# Patient Record
Sex: MALE | Race: WHITE
[De-identification: names, ages, dates, MRNs, and addresses within clinical notes are randomized per-mention and may not be internally consistent; named-entity substitution may affect disease eponyms.]

---

## 2023-04-10 ENCOUNTER — HOSPITAL ENCOUNTER (INPATIENT)
Dept: HOSPITAL 47 - EC | Age: 68
LOS: 5 days | Discharge: NURSING, REHAB OR LONG-TERM CARE | DRG: 460 | End: 2023-04-15
Attending: ORTHOPAEDIC SURGERY | Admitting: ORTHOPAEDIC SURGERY
Payer: COMMERCIAL

## 2023-04-10 DIAGNOSIS — Z79.899: ICD-10-CM

## 2023-04-10 DIAGNOSIS — S19.9XXS: ICD-10-CM

## 2023-04-10 DIAGNOSIS — T38.0X5A: ICD-10-CM

## 2023-04-10 DIAGNOSIS — M47.14: ICD-10-CM

## 2023-04-10 DIAGNOSIS — G89.29: ICD-10-CM

## 2023-04-10 DIAGNOSIS — R29.6: ICD-10-CM

## 2023-04-10 DIAGNOSIS — M62.830: ICD-10-CM

## 2023-04-10 DIAGNOSIS — M62.838: ICD-10-CM

## 2023-04-10 DIAGNOSIS — I10: ICD-10-CM

## 2023-04-10 DIAGNOSIS — Z88.5: ICD-10-CM

## 2023-04-10 DIAGNOSIS — M48.05: ICD-10-CM

## 2023-04-10 DIAGNOSIS — K59.89: ICD-10-CM

## 2023-04-10 DIAGNOSIS — I45.10: ICD-10-CM

## 2023-04-10 DIAGNOSIS — E66.01: ICD-10-CM

## 2023-04-10 DIAGNOSIS — M19.90: ICD-10-CM

## 2023-04-10 DIAGNOSIS — Z98.1: ICD-10-CM

## 2023-04-10 DIAGNOSIS — N39.498: ICD-10-CM

## 2023-04-10 DIAGNOSIS — G83.4: ICD-10-CM

## 2023-04-10 DIAGNOSIS — M48.061: ICD-10-CM

## 2023-04-10 DIAGNOSIS — E78.5: ICD-10-CM
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
## 2023-04-10 DIAGNOSIS — D62: ICD-10-CM

## 2023-04-10 DIAGNOSIS — M48.04: ICD-10-CM

## 2023-04-10 DIAGNOSIS — Z91.81: ICD-10-CM

## 2023-04-10 DIAGNOSIS — D72.828: ICD-10-CM

## 2023-04-10 DIAGNOSIS — Z28.311: ICD-10-CM

## 2023-04-10 DIAGNOSIS — M51.04: Primary | ICD-10-CM

## 2023-04-10 DIAGNOSIS — Z20.822: ICD-10-CM

## 2023-04-10 LAB
ALBUMIN SERPL-MCNC: 4.4 G/DL (ref 3.5–5)
ALP SERPL-CCNC: 57 U/L (ref 38–126)
ALT SERPL-CCNC: 32 U/L (ref 4–49)
ANION GAP SERPL CALC-SCNC: 10 MMOL/L
APTT BLD: 22.1 SEC (ref 22–30)
AST SERPL-CCNC: 30 U/L (ref 17–59)
BASOPHILS # BLD AUTO: 0 K/UL (ref 0–0.2)
BASOPHILS NFR BLD AUTO: 0 %
BUN SERPL-SCNC: 23 MG/DL (ref 9–20)
CALCIUM SPEC-MCNC: 9.2 MG/DL (ref 8.4–10.2)
CHLORIDE SERPL-SCNC: 100 MMOL/L (ref 98–107)
CO2 SERPL-SCNC: 28 MMOL/L (ref 22–30)
EOSINOPHIL # BLD AUTO: 0 K/UL (ref 0–0.7)
EOSINOPHIL NFR BLD AUTO: 1 %
ERYTHROCYTE [DISTWIDTH] IN BLOOD BY AUTOMATED COUNT: 5.48 M/UL (ref 4.3–5.9)
ERYTHROCYTE [DISTWIDTH] IN BLOOD: 13.9 % (ref 11.5–15.5)
GLUCOSE SERPL-MCNC: 112 MG/DL (ref 74–99)
HCT VFR BLD AUTO: 45.5 % (ref 39–53)
HGB BLD-MCNC: 15.9 GM/DL (ref 13–17.5)
INR PPP: 1 (ref ?–1.2)
LYMPHOCYTES # SPEC AUTO: 1.7 K/UL (ref 1–4.8)
LYMPHOCYTES NFR SPEC AUTO: 26 %
MAGNESIUM SPEC-SCNC: 2 MG/DL (ref 1.6–2.3)
MCH RBC QN AUTO: 29.1 PG (ref 25–35)
MCHC RBC AUTO-ENTMCNC: 35 G/DL (ref 31–37)
MCV RBC AUTO: 83.2 FL (ref 80–100)
MONOCYTES # BLD AUTO: 0.5 K/UL (ref 0–1)
MONOCYTES NFR BLD AUTO: 8 %
NEUTROPHILS # BLD AUTO: 4 K/UL (ref 1.3–7.7)
NEUTROPHILS NFR BLD AUTO: 62 %
PH UR: 6 [PH] (ref 5–8)
PLATELET # BLD AUTO: 150 K/UL (ref 150–450)
POTASSIUM SERPL-SCNC: 3.7 MMOL/L (ref 3.5–5.1)
PROT SERPL-MCNC: 7.5 G/DL (ref 6.3–8.2)
PT BLD: 10.8 SEC (ref 9–12)
SODIUM SERPL-SCNC: 138 MMOL/L (ref 137–145)
SP GR UR: 1.02 (ref 1–1.03)
UROBILINOGEN UR QL STRIP: <2 MG/DL (ref ?–2)
WBC # BLD AUTO: 6.4 K/UL (ref 3.8–10.6)

## 2023-04-10 PROCEDURE — 70450 CT HEAD/BRAIN W/O DYE: CPT

## 2023-04-10 PROCEDURE — 85025 COMPLETE CBC W/AUTO DIFF WBC: CPT

## 2023-04-10 PROCEDURE — 86891 AUTOLOGOUS BLOOD OP SALVAGE: CPT

## 2023-04-10 PROCEDURE — 93005 ELECTROCARDIOGRAM TRACING: CPT

## 2023-04-10 PROCEDURE — 85027 COMPLETE CBC AUTOMATED: CPT

## 2023-04-10 PROCEDURE — 72070 X-RAY EXAM THORAC SPINE 2VWS: CPT

## 2023-04-10 PROCEDURE — 85610 PROTHROMBIN TIME: CPT

## 2023-04-10 PROCEDURE — 72158 MRI LUMBAR SPINE W/O & W/DYE: CPT

## 2023-04-10 PROCEDURE — 87635 SARS-COV-2 COVID-19 AMP PRB: CPT

## 2023-04-10 PROCEDURE — 83735 ASSAY OF MAGNESIUM: CPT

## 2023-04-10 PROCEDURE — 96361 HYDRATE IV INFUSION ADD-ON: CPT

## 2023-04-10 PROCEDURE — 71046 X-RAY EXAM CHEST 2 VIEWS: CPT

## 2023-04-10 PROCEDURE — 72131 CT LUMBAR SPINE W/O DYE: CPT

## 2023-04-10 PROCEDURE — 80053 COMPREHEN METABOLIC PANEL: CPT

## 2023-04-10 PROCEDURE — 83605 ASSAY OF LACTIC ACID: CPT

## 2023-04-10 PROCEDURE — 72146 MRI CHEST SPINE W/O DYE: CPT

## 2023-04-10 PROCEDURE — 36415 COLL VENOUS BLD VENIPUNCTURE: CPT

## 2023-04-10 PROCEDURE — 96374 THER/PROPH/DIAG INJ IV PUSH: CPT

## 2023-04-10 PROCEDURE — 85730 THROMBOPLASTIN TIME PARTIAL: CPT

## 2023-04-10 PROCEDURE — 81003 URINALYSIS AUTO W/O SCOPE: CPT

## 2023-04-10 PROCEDURE — 94760 N-INVAS EAR/PLS OXIMETRY 1: CPT

## 2023-04-10 PROCEDURE — 72128 CT CHEST SPINE W/O DYE: CPT

## 2023-04-10 PROCEDURE — 93306 TTE W/DOPPLER COMPLETE: CPT

## 2023-04-10 PROCEDURE — 51798 US URINE CAPACITY MEASURE: CPT

## 2023-04-10 PROCEDURE — 72100 X-RAY EXAM L-S SPINE 2/3 VWS: CPT

## 2023-04-10 PROCEDURE — 80048 BASIC METABOLIC PNL TOTAL CA: CPT

## 2023-04-10 PROCEDURE — 99291 CRITICAL CARE FIRST HOUR: CPT

## 2023-04-10 PROCEDURE — 72192 CT PELVIS W/O DYE: CPT

## 2023-04-10 RX ADMIN — DEXAMETHASONE SODIUM PHOSPHATE SCH MG: 4 INJECTION, SOLUTION INTRAMUSCULAR; INTRAVENOUS at 23:45

## 2023-04-10 RX ADMIN — TAMSULOSIN HYDROCHLORIDE SCH MG: 0.4 CAPSULE ORAL at 20:47

## 2023-04-10 RX ADMIN — NAPROXEN PRN MG: 250 TABLET ORAL at 20:47

## 2023-04-10 RX ADMIN — DEXAMETHASONE SODIUM PHOSPHATE SCH MG: 4 INJECTION, SOLUTION INTRAMUSCULAR; INTRAVENOUS at 19:25

## 2023-04-10 RX ADMIN — DEXAMETHASONE SODIUM PHOSPHATE SCH MG: 4 INJECTION, SOLUTION INTRAMUSCULAR; INTRAVENOUS at 14:59

## 2023-04-10 RX ADMIN — CYCLOBENZAPRINE HYDROCHLORIDE PRN MG: 10 TABLET, FILM COATED ORAL at 20:47

## 2023-04-10 RX ADMIN — CYCLOBENZAPRINE HYDROCHLORIDE PRN MG: 10 TABLET, FILM COATED ORAL at 15:53

## 2023-04-10 RX ADMIN — CEFAZOLIN SCH MLS/HR: 330 INJECTION, POWDER, FOR SOLUTION INTRAMUSCULAR; INTRAVENOUS at 23:51

## 2023-04-10 RX ADMIN — ENOXAPARIN SODIUM SCH MG: 40 INJECTION SUBCUTANEOUS at 15:53

## 2023-04-10 RX ADMIN — NAPROXEN PRN MG: 250 TABLET ORAL at 15:53

## 2023-04-10 RX ADMIN — CEFAZOLIN SCH MLS/HR: 330 INJECTION, POWDER, FOR SOLUTION INTRAMUSCULAR; INTRAVENOUS at 11:19

## 2023-04-10 NOTE — CT
EXAMINATION TYPE: CT brain wo con

 

DATE OF EXAM: 4/10/2023

 

COMPARISON:   None 

 

HISTORY: Weakness and left leg numbness

 

CT DLP: 1188.4 mGycm

 

Unenhanced CT of the brain was performed.  

 

The ventricles, basal cisterns and sulci overlying the cerebral convexities demonstrate mild enlargem
ent. 

 

There is no evidence for intracranial hemorrhage or sulcal effacement.  

 

There is decreased attenuation about the periventricular white matter and deep white matter of both c
erebral hemispheres, compatible with chronic small vessel ischemia. Differential diagnosis does inclu
de demyelination. 

 

No mass effects are seen.No midline shift.

 

Osseous calvarium is intact.  

 

If symptoms persist consider MRI.  

 

IMPRESSION:

 

1. Age related atrophic and chronic small vessel ischemic change without acute intracranial process s
een at this time.

## 2023-04-10 NOTE — MR
EXAMINATION TYPE: MR thoracic spine wo con

 

DATE OF EXAM: 4/10/2023

 

COMPARISON: None

 

HISTORY: Concern for cauda equina syndrome. LE weakness, h/o lumbar fusion L1-S1, multiple falls.

 

CONTRAST:  Performed utilizing mL intravenous gadolinium contrast.  

 

TECHNIQUE: Multiplanar, multiecho imaging on a 3.0 Carli magnet is performed through the thoracic spi
ne. 

 

There is increased signal within the spinal cord at the lower thoracic level possibly T11-12.. There 
is broad-based disc bulging and facet hypertrophy with spinal cord contact and compression. There is 
additional milder narrowing at what appears to be T12-L1. No cord compression however is evident alth
ough the canal is narrowed. Similar narrowing is present in what may be the L1-2 level

 

Disc heights are preserved. Vertebral body heights are preserved. Vertebral body alignment is normal.


 

IMPRESSIONS:

 

1. Spinal canal stenosis with signal abnormality within the mildly compressed cord appears to be at t
he T11-12 level.

2. Scattered milder spinal canal narrowing is present T12-L1 and L1-2 near the edge of the field-of-v
iew

## 2023-04-10 NOTE — P.CONS
History of Present Illness





- Reason for Consult


Consult date: 04/10/23


Medical management


Requesting physician: KULWINDER Townsend





- Chief Complaint


Worsening lower back pain





- History of Present Illness





This is a very pleasant 67-year-old patient who follows with Dr. Jaden Richmond.


Patient has been on disability for 40 years.  Had work-related accident.  Since 

then patient had several surgeries seen many surgeons and has fused vertebra to 

the mid to lower spine and cervical spine.  He is not allowed to lift more than 

5 pounds.  Patient is sensation mid back off words and below that he has altered

sensation.  Patient is incontinent of urine and rhythm 2 minutes has to go.  

Patient a month ago had a fall and has become increasingly weak in the left leg.

 An has to drag his right leg.  Using a walker.  Has been has been getting worse

he decided to come in.  Looking at possible surgery.





Review of systems:


GEN.: None


EYES: None


HEENT: None


NECK: None


RESPIRATORY: None


CARDIOVASCULAR: None


GASTROINTESTINAL: Bowel or urinary incontinence


GENITOURINARY: Incontinence.  Nausea fall he


MUSCULOSKELETAL: As above


LYMPHATICS: None


HEMATOLOGICAL: None  


PSYCHIATRY: None


NEUROLOGICAL: Oozing a walker





Past medical history to include:


Muscle spasms, possibly neurogenic bladder, essential hypertension, 

hyperlipidemia, DJD,





Social history:


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





Physical examination:


VITAL SIGNS: 97.7, 93, 19, 113/57, 99% room air]


GENERAL: BMI 46.2, declining bed not in distress.


EYES: Pupils equal.  Conjunctiva normal.


HEENT: External appearance of nose and ears normal, oral cavity grossly normal.


NECK: JVD not raised; masses not palpable.


HEART: First and second heart sounds are normal;  no edema.  


LUNGS: Respiratory rate normal; clear to auscultation.  


ABDOMEN: Soft,  nontender, liver spleen not palpable, no masses palpable.  Clancy

catheter 


PSYCH: Alert and oriented x3;  mood  and affect normal.  


MUSCULOSKELETAL:No Clubbing/cyanosis;muscles-grossly intact


NEUROLOGICAL: [Cranial nerves grossly intact; no facial asymmetry, decreased 

power in both lower extremity.  Decreased sensation


LYMPHATICS: No lymph nodes palpable in the axilla and neck





INVESTIGATIONS, reviewed in the clinical context:


White count 6.4 hemoglobin 15.9 platelets 150 sodium 138 potassium 3.7 BUN 23 

creatinine 0.79


UA: Negative


CT pelvis without contrast: BG each DG changes at sacrococcygeal junction.  No 

fracture


CT brain: Age-related changes


Knee x-ray: Unremarkable


CT thorax with lumbar spine without contrast: Multilevel foraminal stenosis 

throughout lumbar spine.  Especially at L3-L4 and L5-S1.  Also at L1-L2.


Chest x-ray film personally reviewed by me-unremarkable


EKG tracing personally reviewed by me-sinus rhythm.  Right bundle branch block.





Assessment and plan:





-Patient with work-related injury about 40 years ago.  Has had fusion of some 

cervical vertebra and in the back.  Patient had a fall a month ago.  Progressive

 increasing pain in the back and trouble walking walking.  Also bladder and 

urine has been affected bit worse than before


Being evaluated for surgery by Dr. Vee from orthopedics.  Pain control.





-Urine and bladder incontinence, some acute on chronic element


Clancy catheter





-Morbid obesity BMI 46.2


Weight loss measures





-Muscle spasm


Flexeril 10 mg when necessary





-Essential hypertension


Norvasc 2.5 mg, losartan hydrochlorothiazide 100/25,





-Hyperlipidemia


Crestor 5 mg





-Chronic gait dysfunction uses a walker at baseline.





-Perioperative cardiovascular risk assessment: Patient has underlying low 

exercise tolerance because of chronic back pain and disability.  Patient denies 

any active chest pain or shortness of breath.  We will do obese line 2-D 

echocardiogram.  Patient's had a moderate cardiovascular risk for surgery.  No 

contraindications to proceed with surgery.





Thank you Dr. Townsend





Past Medical History


Past Medical History: Hypertension


Additional Past Medical History / Comment(s): History of multiple surgeries 15 

at his spine.  His most recent lumbar surgery he says was about 2015 with Dr. Chatterjee where he underwent decompression and further fusion.  He is also had 

decompression and fusion in his cervical spine.  He says that normally he 

endplates without walker or cane but he has been increasing his need for 

ambulatory devices over the past 6 weeks and now requires a walker to ambulate 

and can only but about 50% of his weight on his legs.  BACK PAIN


History of Any Multi-Drug Resistant Organisms: None Reported


Past Surgical History: Appendectomy, Back Surgery


Additional Past Surgical History / Comment(s): 15 BACK SURGERIES, BILATERAL KNEE

 SURGERY, NOSE SURGERY


Past Psychological History: No Psychological Hx Reported


Smoking Status: Never smoker


Past Alcohol Use History: Rare


Past Drug Use History: None Reported





Medications and Allergies


                                Home Medications











 Medication  Instructions  Recorded  Confirmed  Type


 


Cyclobenzaprine [Flexeril] 10 mg PO TID PRN 04/10/23 04/10/23 History


 


Losartan/Hydrochlorothiazide 1 tab PO DAILY 04/10/23 04/10/23 History





[Losartan-Hctz 100-25 mg Tab]    


 


Magnesium Citrate and Oxide 250 mg PO DAILY 04/10/23 04/10/23 History





[Magnesium]    


 


Naproxen Sodium [Aleve] 220 mg PO TID PRN 04/10/23 04/10/23 History


 


Potassium Chloride [Klor-Con M20] 20 meq PO DAILY 04/10/23 04/10/23 History


 


Rosuvastatin Calcium 5 mg PO DAILY 04/10/23 04/10/23 History


 


Sennosides/Docusate Sodium [Senna 1 cap PO DAILY 04/10/23 04/10/23 History





Plus 8.6-50 mg Softgel]    


 


Tamsulosin HCl [Flomax] 0.4 mg PO HS 04/10/23 04/10/23 History


 


amLODIPine [Norvasc] 2.5 mg PO DAILY 04/10/23 04/10/23 History


 


hydroCHLOROthiazide [Hydrodiuril] 25 mg PO DAILY 04/10/23 04/10/23 History








                                    Allergies











Allergy/AdvReac Type Severity Reaction Status Date / Time


 


morphine Allergy  Unknown Verified 04/10/23 09:04


 


oxycodone HCl Allergy  Unknown Verified 04/10/23 09:04





[From OxyContin]     














Physical Exam


Vitals: 


                                   Vital Signs











  Temp Pulse Pulse Resp BP BP Pulse Ox


 


 04/10/23 14:00  97.7 F   93  19   113/57  99


 


 04/10/23 12:05  97.2 F L   87  18   133/71  98


 


 04/10/23 12:03   82   18  148/74   98


 


 04/10/23 11:05   80   18  141/86   97


 


 04/10/23 07:47  98.2 F  92   18  149/88   99








                                Intake and Output











 04/10/23 04/10/23 04/10/23





 06:59 14:59 22:59


 


Output Total  960 


 


Balance  -960 


 


Output:   


 


  Urine  750 


 


    Uretheral (Clancy)  300 


 


  Post Void Residual  210 


 


Other:   


 


  Voiding Method  Indwelling Catheter 


 


  Weight  158.757 kg 














Results


CBC & Chem 7: 


                                 04/10/23 08:46





                                 04/10/23 10:17


Labs: 


                  Abnormal Lab Results - Last 24 Hours (Table)











  04/10/23 Range/Units





  10:17 


 


BUN  23 H  (9-20)  mg/dL


 


Glucose  112 H  (74-99)  mg/dL

## 2023-04-10 NOTE — CT
EXAMINATION TYPE: CT thor lumbar spine wo con

 

DATE OF EXAM: 4/10/2023

 

COMPARISON: None

 

HISTORY: Leg numbness and weakness

 

CT DLP: 4173.2 mGycm

Automated exposure control for dose reduction was used.

 

Contrast: None

 

Technique: Axial images 3 mm thick sections. Reconstructed images in the coronal and sagittal plane.

 

FINDINGS: 

Note is made of anterior cervical fusion lower cervical spine. There are also pedicle screws present 
L5-S1 and L1-2. Vertebral body heights are preserved. There is straightening of the spine throughout 
its visualized course.

 

Diffuse loss of disc height is present through the thoracic spine. Loss of disc height is evident wit
hin the lumbar spine as well. No AP spinal canal stenosis is present within the thoracic spine.

 

No lumbar AP spinal canal stenosis present. Facet degenerative changes are within the lumbar spine. F
oraminal stenosis is present L5-S1 bilaterally. L4-5 foraminal narrowing is present at L3-4 foraminal
 stenosis present. L2-3 foraminal stenosis is present foraminal narrowing is present L1-2.

 

IMPRESSION: 

1. MULTILEVEL FORAMINAL STENOSIS THROUGH THE LUMBAR SPINE. THIS IS MOST SEVERE WITHIN THE LOWER LUMBA
R SPINE L3-4 THROUGH L5-S1. SEVERE FORAMINAL STENOSIS ALSO APPEARS TO BE PRESENT AT L1-2.

2. MILD DEGENERATIVE DISC CHANGES THROUGH THE THORACIC SPINE.

## 2023-04-10 NOTE — MR
EXAMINATION TYPE: MR lumbar spine wo/w con

 

DATE OF EXAM: 4/10/2023

 

COMPARISON: 11/4/2015

 

HISTORY: Concern for cauda equina syndrome. LE weakness, h/o lumbar fusion L1-S1, multiple falls.

 

CONTRAST: 0 mL intravenous Gadavist.

 

TECHNIQUE: 

Multiplanar, multisequence images of the lumbar spine were acquired.

 

FINDINGS:  

L5-S1: No spinal canal stenosis. There is limitation on evaluation of the foramen due to susceptibili
ty artifact through the disc space. Pedicle screws also have some susceptibility artifact.

 

L4-L5: No focal disc herniation or significant disc bulge. No spinal canal stenosis. No obvious alyce
inal stenosis.

 

L3-L4: No significant disc bulge or disc herniation.  No spinal canal stenosis.  No foraminal stenosi
s.  

 

L2-L3: Disc spacers present. No spinal canal stenosis or neural foraminal stenosis is present.

 

L1-L2: No significant disc bulge or disc herniation.  No spinal canal stenosis.  No foraminal stenosi
s. Susceptibility artifact is noted. This limits some of the posterior elements. Previous disc bulgin
g with significant anterior thecal sac compression are not evident at this time. Previous disc hernia
tion is not identified.

 

T12-L1: This level is essentially nondiagnostic due to significant susceptibility artifact. No obviou
s spinal canal stenosis.

 

 

 

IMPRESSION:

1. Some limitation through portion of the examination due to susceptibility artifact from pedicle scr
ews and disc spacers.

2. No obvious spinal canal stenosis or significant neural foraminal stenosis.

## 2023-04-10 NOTE — P.HPOR
History of Present Illness


H&P Date: 04/10/23


Chief Complaint: Lower extremity weakness with multiple falls over the past 6 

weeks





The patient is seen and examined at bedside.  He is a very pleasant 67-year-old 

former  who has been having multiple falls and weakness at his lower 

extremities worse on the left than the right.  He has long history of lumbar 

issues and has undergone what he says is 15 spine surgeries.  He says the most 

recent one was at his lower back around 2015.  He says that since that time he 

has been managing adequately and able get around until about 6 weeks ago when he

went to help in this kick a ball and had a significant fall.  His legs a been 

giving way.  He feels that his symptoms and worsening in his lower extremity is 

worse on the left than the right.  


He says he cannot bear full weight on his legs when he tries to bend they will 

give way.  He says he's been using a walker and bear using about 50% of his 

weight in his arms and only about 50% his weight his lower extremities.  Prior 

to that he was using a cane and then just a few months ago he was not using any 

ambulatory aid.  She has not had specific treatment over the past 6 weeks regard

to his back.  





He does not feel like he can ambulate on his own.  He says he has been having 

more trouble with his urinating.  He has had some constipation and some 

difficulty discerning if he is having flatus or a bowel movement.  He is aware 

when he tries have a bowel movement and he is not had loss of control of his 

bowels.  He is having some retention of urine.  He feels his left leg is 

significant worse than his right.  He does feel symptoms bilaterally.  He is not

particularly having back pain.  He denies any changes in his upper extremities. 

He denies any neck pain.  He has had prior surgery in his neck but he does not 

feel this is giving him any new problems.  He is not having pain in his mid 

back.  He denies any chest pain shortness of breath.


His prior surgeries were done in approximately 2015 and prior.  His surgeries 

were done with Dr. Chatterjee an outside institution.





Normally takes blood pressure medications muscle relaxers and over-the-counter 

pain relievers.  In the past he had been on OxyContin and hydrocodone but he is 

not taking these anymore.





Review of Systems





As stated per HPI.  Weakness in bilateral lower extremities and unable to 

ambulate with multiple falls.  Symptoms are worse than his left than his right. 

Some changes in his bladder function was retention.  He has not lost control of 

his bowels.  He denies significant pain in his mid and lower back but he has 

constant pain around his lower back.  He denies problems in his upper 

extremities or in his neck.  He denies any nausea vomiting fevers chills or 

night sweats.  Denies any chest pain shortness of breath





Past Medical History


Past Medical History: Hypertension


Additional Past Medical History / Comment(s): History of multiple surgeries 15 

at his spine.  His most recent lumbar surgery he says was about 2015 with Dr. Chatterjee where he underwent decompression and further fusion.  He is also had 

decompression and fusion in his cervical spine.  He says that normally he 

endplates without walker or cane but he has been increasing his need for 

ambulatory devices over the past 6 weeks and now requires a walker to ambulate 

and can only but about 50% of his weight on his legs.  BACK PAIN


History of Any Multi-Drug Resistant Organisms: None Reported


Past Surgical History: Appendectomy, Back Surgery


Additional Past Surgical History / Comment(s): 15 BACK SURGERIES, BILATERAL KNEE

SURGERY, NOSE SURGERY


Past Psychological History: No Psychological Hx Reported


Smoking Status: Never smoker


Past Alcohol Use History: Rare


Past Drug Use History: None Reported





Medications and Allergies


                                Home Medications











 Medication  Instructions  Recorded  Confirmed  Type


 


Cyclobenzaprine [Flexeril] 10 mg PO TID PRN 04/10/23 04/10/23 History


 


Losartan/Hydrochlorothiazide 1 tab PO DAILY 04/10/23 04/10/23 History





[Losartan-Hctz 100-25 mg Tab]    


 


Magnesium Citrate and Oxide 250 mg PO DAILY 04/10/23 04/10/23 History





[Magnesium]    


 


Naproxen Sodium [Aleve] 220 mg PO TID PRN 04/10/23 04/10/23 History


 


Potassium Chloride [Klor-Con M20] 20 meq PO DAILY 04/10/23 04/10/23 History


 


Rosuvastatin Calcium 5 mg PO DAILY 04/10/23 04/10/23 History


 


Sennosides/Docusate Sodium [Senna 1 cap PO DAILY 04/10/23 04/10/23 History





Plus 8.6-50 mg Softgel]    


 


Tamsulosin HCl [Flomax] 0.4 mg PO HS 04/10/23 04/10/23 History


 


amLODIPine [Norvasc] 2.5 mg PO DAILY 04/10/23 04/10/23 History


 


hydroCHLOROthiazide [Hydrodiuril] 25 mg PO DAILY 04/10/23 04/10/23 History








                                    Allergies











Allergy/AdvReac Type Severity Reaction Status Date / Time


 


morphine Allergy  Unknown Verified 04/10/23 09:04


 


oxycodone HCl Allergy  Unknown Verified 04/10/23 09:04





[From OxyContin]     














Physical Examination


Osteopathic Statement: *.  No significant issues noted on an osteopathic 

structural exam other than those noted in the History and Physical/Consult.





- L Spine: dermatomal strength & reflexes


  ** bilateral


Strength: hip flexion: 3/5 (His back is well-healed incisions.  He is obese.  

His left lower extremity he is able lift off the bed with about 3 out of 5 

strength.  He is able to flex his knees about 3-5 strength his right lower 

extremity has 5 out of 5 strength with hip flexion the extension)


Strength: ankle dorsiflexion: 1/5 (Dorsiflexion his left ankle foot and toes is 

about 1-2 out of 5.  His weakness with dorsal flexion plantarflexion on the 

left.  He has about 4 out of 5 dorsiflexion plantarflexion of the right.)


Strength: ankle plantar flexion: 2/5 (2 out of 5 plantar flexion on the left 

with 4 out of 5 intervention of the right.  No clonus no hyperreflexia.  He has 

some chronic stasis skin changes in his bilateral lower extremities)





Results





- Labs


Labs: 


                  Abnormal Lab Results - Last 24 Hours (Table)











  04/10/23 Range/Units





  10:17 


 


BUN  23 H  (9-20)  mg/dL


 


Glucose  112 H  (74-99)  mg/dL








                                      H & H











  04/10/23 Range/Units





  08:46 


 


Hgb  15.9  (13.0-17.5)  gm/dL


 


Hct  45.5  (39.0-53.0)  %








                                   Coagulation











  04/10/23 Range/Units





  08:46 


 


INR  1.0  (<1.2)  











Result Diagrams: 


                                 04/10/23 08:46





                                 04/10/23 10:17





- Diagnostic results


CT Scan - lumbar: report reviewed (The report does not mention significant 

stenosis as thoracic spine but I feel that there is evidence of some central 

stenosis at his lower thoracic spine.  Particularly at T10 11 T11 12 and T12-L1.

 I do not see any obvious fracture or dislocation.), image reviewed (Images of 

the thoracic lumbar spine with CT are reviewed.  He has prior fusion from L1 to 

S1 with instrumentation at L1 to L2 3 and L4 5 and S1.  The hardware appears to 

be stable without change it appears to have solid fusion.  There are significant

changes above his prior fusion)





Assessment and Plan


Assessment: 





67-year-old male with new weakness in bilateral lower extremity is worse on left

than right


Extensive lumbar history with multiple prior surgeries on his lumbar spine with 

stable fusion L1 to S1 performed by Dr. Chatterjee in 2015


Some urinary retention


Obesity


Evidence of thoracic stenosis


Possible spinal cord injury versus the possibility of cauda equina syndrome


History of cervical fusion and decompression which appears stable without upper 

extremity change


Inability to ambulate


Hypertension


Multiple falls


Plan: 


67-year-old male with new weakness in bilateral lower extremity is worse on left

than right


Extensive lumbar history with multiple prior surgeries on his lumbar spine with 

stable fusion L1 to S1 performed by Dr. Chatterjee in 2015


Some urinary retention


Obesity


Evidence of thoracic stenosis


Possible spinal cord injury versus the possibility of cauda equina syndrome


History of cervical fusion and decompression which appears stable without upper 

extremity change


Inability to ambulate


Hypertension


Multiple falls





This gentleman has extensive history through his spine with multiple spinal 

surgeries.  The fusion from L1 to S1 on imaging appears to be stable overall.  I

do not see an obvious fracture dislocation.  Unless he is having significant 

changes at his lower extremities since a fall approximately 6 weeks ago and is 

having multiple falls with continued and possible progressive weakness at this 

point.  The computed tomography scan does not show evidence of obvious fracture 

or change in the hardware.  The hardware overall appears to be stable from L1 

S1.  I think that there is some evidence of thoracic stenosis and that further 

imaging is necessary given his new lower extremity weakness and prior multiple 

surgeries.  We have ordered MRI of his lumbar spine and his thoracic spine to be

done stat.





He does not seem to be having issues from his cervical spine and he is not 

having obvious upper motor neuron signs at this point.  He does have significant

weakness particularly at his left lower extremity and further imaging with MRI 

of his thoracic spine and lumbar spine are necessary.  We'll follow this imaging

closely and determine further recommendations to follow.





He is having some urinary retention and has a Clancy catheter intact now.


I think that we need further imaging to better evaluate his condition and to 

determine if there is further action and possible decompression.  He has been 

receiving IV steroid medications and we should continue with this.


He is continue medical management as well.

## 2023-04-10 NOTE — ED
General Adult HPI





- General


Chief complaint: Weakness


Stated complaint: Weakness


Time Seen by Provider: 04/10/23 07:56


Source: patient, EMS, RN notes reviewed, old records reviewed


Mode of arrival: EMS


Limitations: no limitations





- History of Present Illness


Initial comments: 





Patient is a 67-year-old male who presents emergency Department complaining of 

progressive 1 month history of worsening lower extremity weakness, paresthesias.

 Left is worse than right in terms of weakness, numbness.  Patient does have 

some mild degree of weakness at baseline but over the last month has progressed 

somewhat slow but he is not using a walker.  Has had recurrent falls onto his 

knees where he states his legs just give out on him.  Denies any acute changes 

in stooling or urinary habits.  Denies any new urinary or bowel incontinence or 

retention.  Endorses some suprapubic abdominal discomfort but no other acute 

complaints at this time.  Denies chest pain, shortness of breath.  Endorses 

weakness in lower extremities.  Did fall and has some bruising over his 

tailbone.  Is not on blood thinners.  Did not lose consciousness.  Denies 

hitting his head.  His no other acute complaints at this time.  Presents for 

further evaluation at this time.Patient does have a history of multiple spinal 

surgeries done in over 20 years ago and no longer follows up with his surgeon 

who he believes has may be retired.





- Related Data


                                Home Medications











 Medication  Instructions  Recorded  Confirmed


 


Cyclobenzaprine [Flexeril] 10 mg PO TID PRN 04/10/23 04/10/23


 


Losartan/Hydrochlorothiazide 1 tab PO DAILY 04/10/23 04/10/23





[Losartan-Hctz 100-25 mg Tab]   


 


Magnesium Citrate and Oxide 250 mg PO DAILY 04/10/23 04/10/23





[Magnesium]   


 


Naproxen Sodium [Aleve] 220 mg PO TID PRN 04/10/23 04/10/23


 


Potassium Chloride [Klor-Con M20] 20 meq PO DAILY 04/10/23 04/10/23


 


Rosuvastatin Calcium 5 mg PO DAILY 04/10/23 04/10/23


 


Sennosides/Docusate Sodium [Senna 1 cap PO DAILY 04/10/23 04/10/23





Plus 8.6-50 mg Softgel]   


 


Tamsulosin HCl [Flomax] 0.4 mg PO HS 04/10/23 04/10/23


 


amLODIPine [Norvasc] 2.5 mg PO DAILY 04/10/23 04/10/23


 


hydroCHLOROthiazide [Hydrodiuril] 25 mg PO DAILY 04/10/23 04/10/23











                                    Allergies











Allergy/AdvReac Type Severity Reaction Status Date / Time


 


morphine Allergy  Unknown Verified 04/10/23 09:04


 


oxycodone HCl Allergy  Unknown Verified 04/10/23 09:04





[From OxyContin]     














Review of Systems


ROS Statement: 


Those systems with pertinent positive or pertinent negative responses have been 

documented in the HPI.


Review of Systems:


CONST: Denies fever 


EYES: Denies blurry vision 


ENT: Denies nasal congestion  


C/V:  Denies Chest pain


RESP: Denies shortness of breath 


GI: Denies abdominal pain 


: Denies dysuria  


SKIN: Endorses bruising over tailbone


MSK: Endorses like weakness


NEURO: Denies headache 


ROS Other: All systems not noted in ROS Statement are negative.





Past Medical History


Past Medical History: Hypertension


Additional Past Medical History / Comment(s): BACK PAIN


History of Any Multi-Drug Resistant Organisms: None Reported


Past Surgical History: Appendectomy, Back Surgery


Additional Past Surgical History / Comment(s): 13 BACK SURGERIES, BILATERAL KNEE

SURGERY, NOSE SURGERY


Past Psychological History: No Psychological Hx Reported


Smoking Status: Never smoker


Past Alcohol Use History: Rare


Past Drug Use History: None Reported





General Exam





- General Exam Comments


Initial Comments: 





General: Appears in mild distress.


HEAD:  Normal with no signs of head trauma.


EYES:  PERRLA, EOMI, conjunctiva normal, no discharge.  Pupils are 3 mm and 

equal bilaterally.


ENT:  Hearing grossly intact, normal oropharynx.


RESPIRATORY:  Clear breath sounds bilaterally.  No wheezes, rales, or rhonchi.  


C/V:  Regular rate and rhythm. S1 and S2 auscultated, bilateral symetrical lower

extremity pitting edema, peripheral pulses 2+ and intact throughout


ABD: Soft, nondistended.  Mild suprapubic discomfort.  No guarding.  No rebound 

tenderness.  Rectal exam is remarkable for what appears to be moderate rectal 

tone, as well as no gross blood on exam.


EXT: No obvious deformity.  Decreased range of motion in the patient's left knee

and right knee secondary to generalized weakness.  Left wrist on the right.  

Pelvis is stable.  Tenderness to palpation over the coccyx.  No step-offs or 

deformities noted.


SKIN:  No rashes or lesions observed on exposed skin.  Posterior spinal surgical

incisions appear within acceptable limits. Bruising over the coccyx.


NEURO: Alert and oriented 4.  Cranial history through 12 are intact.  Weakness 

and bilateral lower extremities, as well as decreased sensation to light touch 

in bilateral lower extremities.  Weakness seems to be worse in the left lower 

extremity.  Sensory deficits are equal.  Patient appears to have moderate rectal

tone on rectal exam.  No gross bleeding.  No other focal neurological deficits.


Limitations: no limitations





Course


                                   Vital Signs











  04/10/23 04/10/23





  07:47 11:05


 


Temperature 98.2 F 


 


Pulse Rate 92 80


 


Respiratory 18 18





Rate  


 


Blood Pressure 149/88 141/86


 


O2 Sat by Pulse 99 97





Oximetry  














Medical Decision Making





- Medical Decision Making





Was pt. sent in by a medical professional or institution (, PA, NP, urgent 

care, hospital, or nursing home...) When possible be specific


@  -No


Did you speak to anyone other than the patient for history (EMS, parent, family,

police, friend...)? What history was obtained from this source 


@  -No


Did you review nursing and triage notes (agree or disagree)?  Why? 


@  -I reviewed and agree with nursing and triage notes


Were old charts reviewed (outside hosp., previous admission, EMS record, old 

EKG, old radiological studies, urgent care reports/EKG's, nursing home records)?

Report findings 


@  -No old charts were reviewed


Differential Diagnosis (chest pain, altered mental status, abdominal pain women,

abdominal pain men, vaginal bleeding, weakness, fever, dyspnea, syncope, 

headache, dizziness, GI bleed, back pain, seizure, CVA, palpatations, mental 

health, musculoskeletal)? 


@  -Differential Weakness:


Hypoglycemia, shock, sepsis, hyponatremia, anemia, infection, MI, ETOH, adverse 

medicine reaction, overdose, stroke, cauda equina syndrome, nerve compression, 

lumbar spine injury this is not meant to be an all-inclusive list.





EKG interpreted by me (3pts min.).


@  -As above


X-rays interpreted by me (1pt min.).


@  -Chest x-ray shows no acute cardio pulmonary process is obvious.  Knee x-rays

reveal no obvious acute process.


CT interpreted by me (1pt min.).


@  -CT brain reveals no acute intracranial process.  Pelvic CT reveals no 

evidence of coccygeal fracture.  There is significant degenerative changes.  

T-spine and L-spine CT revealed multilevel lumbar stenosis which is severe.


U/S interpreted by me (1pt. min.).


@  -None done


What testing was considered but not performed or refused? (CT, X-rays, U/S, 

labs)? Why?


@  -None


What meds were considered but not given or refused? Why?


@  -I did offer the patient analgesic medications which she declines initially. 

We'll reevaluate.


Did you discuss the management of the patient with other professionals 

(professionals i.e. , PA, NP, lab, RT, psych nurse, , , 

teacher, , )? Give summary


@  -No


Was smoking cessation discussed for >3mins.?


@  -No


Was critical care preformed (if so, how long)?


@  -yes, 35 min


Were there social determinants of health that impacted care today? How? 

(Homelessness, low income, unemployed, alcoholism, drug addiction, 

transportation, low edu. Level, literacy, decrease access to med. care, intermediate, 

rehab)?


@  -No


Was there de-escalation of care discussed even if they declined (Discuss DNR or 

withdrawal of care, Hospice)? DNR status


@  -No


What co-morbidities impacted this encounter? (DM, HTN, Smoking, COPD, CAD, 

Cancer, CVA, ARF, Chemo, Hep., AIDS, mental health diagnosis, sleep apnea, 

morbid obesity)?


@  -History of lumbar spine injuries as well as cauda equina syndrome.


Was patient admitted / discharged? Hospital course, mention meds given and 

route, prescriptions, significant lab abnormalities, going to OR and other perti

nent info.


@  -Based on the patient's presentation and physical exam, he presents over 

concern for his progressive weakness of his lower extremities as well as 

paresthesias.  Exam is remarkable for the paresthesias as well as left lower 

extremity weakness any worse compared to right.  Has been ongoing for 1 month.  

History of cauda equina syndrome as well as surgeries.  We will obtain 

generalized weakness labs as well as CT imaging of the lumbar spine, head.  I 

did discuss with him that I am concerned for possible cauda equina syndrome due 

to the paresthesias as well as weakness in both legs.  He also has a history of 

it.  Rectal tone appears adequate at this time.  He was in agreement this plan. 

He will likely be admitted.  We will obtain a postvoid residual bladder scan as 

well.





Postvoid bladder scan was still in the 200s.  Patient states this seems to be 

getting worse lately and I am concerned he is retaining.  This does fit with the

concern for cauda equina syndrome as he has the weakness in lower extremities, 

numbness, moderate rectal tone, as well as urinary retention.  Clancy catheter is

placed.  Imaging is remarkable for the severe lumbar stenosis but no other acute

findings.  Labs are remarkable for a normal lactic acid.  Urine is unremarkable.

 Remainder the labs are within acceptable limits.  EKG showed no signs of acute 

ischemia.





At this time I updated the patient.  I will recheck the on-call spine surgeon 

Dr. Townsend.  We did discuss the case and he was in agreement with starting the 

patient on IV steroids which patient was given 10 mg initially of Decadron IV 

and started on every 4 hours 4 mg IV.  Home meds were ordered.  MRI of the 

lumbar spine with and without contrast ordered at his request.  He otherwise was

in agreement with management and accepted the patient.  Dr. Bee was notified

of been consulted for medical management with the patient.  Patient was admitted

in serious condition.  MRI pending.  Patient was in agreement with this plan.


Undiagnosed new problem with uncertain prognosis?


@  -No


Drug Therapy requiring intensive monitoring for toxicity (Heparin, Nitro, 

Insulin, Cardizem)?


@  -No


Were any procedures done?


@  -No.





Diagnosis/symptom?


@  -Cauda equina syndrome, urinary retention


Acute, or Chronic, or Acute on Chronic?


@  -Acute


Uncomplicated (without systemic symptoms) or Complicated (systemic symptoms)?


@  -Complicated


Side effects of treatment?


@  -none


Exacerbation, Progression, or Severe Exacerbation]


@  -no


Poses a threat to life or bodily function?


@  -Yes, potentially if he continues to worsen.





- Lab Data


Result diagrams: 


                                 04/10/23 08:46





                                 04/10/23 10:17


                                   Lab Results











  04/10/23 04/10/23 04/10/23 Range/Units





  08:46 08:46 08:46 


 


WBC  6.4    (3.8-10.6)  k/uL


 


RBC  5.48    (4.30-5.90)  m/uL


 


Hgb  15.9    (13.0-17.5)  gm/dL


 


Hct  45.5    (39.0-53.0)  %


 


MCV  83.2    (80.0-100.0)  fL


 


MCH  29.1    (25.0-35.0)  pg


 


MCHC  35.0    (31.0-37.0)  g/dL


 


RDW  13.9    (11.5-15.5)  %


 


Plt Count  150    (150-450)  k/uL


 


MPV  8.9    


 


Neutrophils %  62    %


 


Lymphocytes %  26    %


 


Monocytes %  8    %


 


Eosinophils %  1    %


 


Basophils %  0    %


 


Neutrophils #  4.0    (1.3-7.7)  k/uL


 


Lymphocytes #  1.7    (1.0-4.8)  k/uL


 


Monocytes #  0.5    (0-1.0)  k/uL


 


Eosinophils #  0.0    (0-0.7)  k/uL


 


Basophils #  0.0    (0-0.2)  k/uL


 


PT   10.8   (9.0-12.0)  sec


 


INR   1.0   (<1.2)  


 


APTT   22.1   (22.0-30.0)  sec


 


Sodium     (137-145)  mmol/L


 


Potassium     (3.5-5.1)  mmol/L


 


Chloride     ()  mmol/L


 


Carbon Dioxide     (22-30)  mmol/L


 


Anion Gap     mmol/L


 


BUN     (9-20)  mg/dL


 


Creatinine     (0.66-1.25)  mg/dL


 


Est GFR (CKD-EPI)AfAm     (>60 ml/min/1.73 sqM)  


 


Est GFR (CKD-EPI)NonAf     (>60 ml/min/1.73 sqM)  


 


Glucose     (74-99)  mg/dL


 


Plasma Lactic Acid Mihai     (0.7-2.0)  mmol/L


 


Calcium     (8.4-10.2)  mg/dL


 


Magnesium     (1.6-2.3)  mg/dL


 


Total Bilirubin     (0.2-1.3)  mg/dL


 


AST     (17-59)  U/L


 


ALT     (4-49)  U/L


 


Alkaline Phosphatase     ()  U/L


 


Total Protein     (6.3-8.2)  g/dL


 


Albumin     (3.5-5.0)  g/dL


 


Urine Color    Yellow  


 


Urine Appearance    Clear  (Clear)  


 


Urine pH    6.0  (5.0-8.0)  


 


Ur Specific Gravity    1.020  (1.001-1.035)  


 


Urine Protein    Negative  (Negative)  


 


Urine Glucose (UA)    Negative  (Negative)  


 


Urine Ketones    Negative  (Negative)  


 


Urine Blood    Negative  (Negative)  


 


Urine Nitrite    Negative  (Negative)  


 


Urine Bilirubin    Negative  (Negative)  


 


Urine Urobilinogen    <2.0  (<2.0)  mg/dL


 


Ur Leukocyte Esterase    Negative  (Negative)  














  04/10/23 04/10/23 Range/Units





  10:10 10:17 


 


WBC    (3.8-10.6)  k/uL


 


RBC    (4.30-5.90)  m/uL


 


Hgb    (13.0-17.5)  gm/dL


 


Hct    (39.0-53.0)  %


 


MCV    (80.0-100.0)  fL


 


MCH    (25.0-35.0)  pg


 


MCHC    (31.0-37.0)  g/dL


 


RDW    (11.5-15.5)  %


 


Plt Count    (150-450)  k/uL


 


MPV    


 


Neutrophils %    %


 


Lymphocytes %    %


 


Monocytes %    %


 


Eosinophils %    %


 


Basophils %    %


 


Neutrophils #    (1.3-7.7)  k/uL


 


Lymphocytes #    (1.0-4.8)  k/uL


 


Monocytes #    (0-1.0)  k/uL


 


Eosinophils #    (0-0.7)  k/uL


 


Basophils #    (0-0.2)  k/uL


 


PT    (9.0-12.0)  sec


 


INR    (<1.2)  


 


APTT    (22.0-30.0)  sec


 


Sodium   138  (137-145)  mmol/L


 


Potassium   3.7  (3.5-5.1)  mmol/L


 


Chloride   100  ()  mmol/L


 


Carbon Dioxide   28  (22-30)  mmol/L


 


Anion Gap   10  mmol/L


 


BUN   23 H  (9-20)  mg/dL


 


Creatinine   0.79  (0.66-1.25)  mg/dL


 


Est GFR (CKD-EPI)AfAm   >90  (>60 ml/min/1.73 sqM)  


 


Est GFR (CKD-EPI)NonAf   >90  (>60 ml/min/1.73 sqM)  


 


Glucose   112 H  (74-99)  mg/dL


 


Plasma Lactic Acid Mihai  1.5   (0.7-2.0)  mmol/L


 


Calcium   9.2  (8.4-10.2)  mg/dL


 


Magnesium   2.0  (1.6-2.3)  mg/dL


 


Total Bilirubin   1.1  (0.2-1.3)  mg/dL


 


AST   30  (17-59)  U/L


 


ALT   32  (4-49)  U/L


 


Alkaline Phosphatase   57  ()  U/L


 


Total Protein   7.5  (6.3-8.2)  g/dL


 


Albumin   4.4  (3.5-5.0)  g/dL


 


Urine Color    


 


Urine Appearance    (Clear)  


 


Urine pH    (5.0-8.0)  


 


Ur Specific Gravity    (1.001-1.035)  


 


Urine Protein    (Negative)  


 


Urine Glucose (UA)    (Negative)  


 


Urine Ketones    (Negative)  


 


Urine Blood    (Negative)  


 


Urine Nitrite    (Negative)  


 


Urine Bilirubin    (Negative)  


 


Urine Urobilinogen    (<2.0)  mg/dL


 


Ur Leukocyte Esterase    (Negative)  














- EKG Data


-: EKG Interpreted by Me


EKG Comments: 





12-lead Electrocardiogram Interpretation Note





EKG was reviewed and interpreted by myself. 12-lead ECG performed at 0826 is 

interpreted by me as revealing normal sinus rhythm at a rate of 81 beats per 

minute.  Axis is normal.  OK interval is 160 ms, QRS duration is 164 ms, QTc is 

451 ms..  Patient does have right bundle-branch block morphology.  There were no

ST or T wave abnormalities to suggest myocardial ischemia or injury. R wave 

progression across the precordium was satisfactory. By my interpretation this 

EKG is non-diagnostic for acute ischemia.  No prior EKG for comparison.





Critical Care Time


Critical Care Time: Yes


Total Critical Care Time: 35


Critical Care Time: 





Upon my evaluation, this patient had a high probability of imminent or life-

threatening deterioration due to equina syndrome, frequent neuro checks, which 

required my direct attention, intervention, and personal management. 


I have personally provided 35 minutes of critical care time exclusive of time 

spent on separately billable procedures. Time includes review of laboratory 

data, radiology results, discussion with consultants, and monitoring for 

potential decompensation. Interventions were performed as documented in my note.





Disposition


Clinical Impression: 


 Cauda equina syndrome





Disposition: ADMITTED AS IP TO THIS HOSP


Condition: Serious


Time of Disposition: 10:55

## 2023-04-10 NOTE — XR
EXAMINATION TYPE: XR knee limited bilateral

 

DATE OF EXAM: 4/10/2023

 

COMPARISON: None

 

HISTORY: Fall, bilateral knee pain

 

TECHNIQUE: Bilateral knees are examined in 2 projections.

 

FINDINGS: Bilateral knee prostheses are present. No acute fractures are evident. No joint effusions a
re evident.

 

IMPRESSION:

1.  No acute osseous abnormalities bilateral knees.

## 2023-04-10 NOTE — XR
EXAMINATION TYPE: XR chest 2V

 

DATE OF EXAM: 4/10/2023

 

COMPARISON: None

 

INDICATION: Weakness

 

TECHNIQUE:  Frontal and lateral views of the chest are obtained.

 

FINDINGS:  

The heart size is normal.  

The pulmonary vasculature is normal.

The lungs are clear.

 

IMPRESSION:  

1. No acute pulmonary process.

## 2023-04-10 NOTE — CT
EXAMINATION TYPE: CT pelvis wo con

 

DATE OF EXAM: 4/10/2023

 

COMPARISON: None

 

HISTORY: Weakness and left leg numbness

 

CT DLP: 2543.2 mGycm

Automated exposure control for dose reduction was used.

 

Contrast: None

 

Technique: Axial images 3 mm thick sections. Constructed images in the coronal and sagittal plane.

 

FINDINGS: 

Postsurgical changes are in the lumbosacral spine region. Urinary bladder appears normal. Prostate is
 unremarkable. Loops of bowel within the pelvis without oral contrast appear Remarkable.

 

Sacroiliac joints are narrowed bilaterally compatible some degenerative changes. Femoral heads articu
late with the acetabulum. Cam deformity is present bilaterally. Joint spaces are narrowed. No acute f
racture of the cecum or coccyx is identified. Sacrococcygeal junction may have minimal posterior subl
uxation but otherwise appears unremarkable.

 

IMPRESSION: 

1. DEGENERATIVE CHANGES AT THE SACROCOCCYGEAL JUNCTION. VERY MINIMAL RETROLISTHESIS IS NOT EXCLUDED. 
ACUTE FRACTURE IS NOT IDENTIFIED.

## 2023-04-11 LAB
ANION GAP SERPL CALC-SCNC: 14.1 MMOL/L (ref 10–18)
BASOPHILS # BLD AUTO: 0.01 X 10*3/UL (ref 0–0.1)
BASOPHILS NFR BLD AUTO: 0.1 %
BUN SERPL-SCNC: 21.4 MG/DL (ref 9–27)
BUN/CREAT SERPL: 22.79 RATIO (ref 12–20)
CALCIUM SPEC-MCNC: 9.2 MG/DL (ref 8.7–10.3)
CHLORIDE SERPL-SCNC: 103 MMOL/L (ref 96–109)
CO2 SERPL-SCNC: 21.4 MMOL/L (ref 20–27.5)
EOSINOPHIL # BLD AUTO: 0 X 10*3/UL (ref 0.04–0.35)
EOSINOPHIL NFR BLD AUTO: 0 %
ERYTHROCYTE [DISTWIDTH] IN BLOOD BY AUTOMATED COUNT: 5.07 X 10*6/UL (ref 4.4–5.6)
ERYTHROCYTE [DISTWIDTH] IN BLOOD: 13.2 % (ref 11.5–14.5)
GLUCOSE SERPL-MCNC: 160 MG/DL (ref 70–110)
HCT VFR BLD AUTO: 43.3 % (ref 39.6–50)
HGB BLD-MCNC: 14.6 G/DL (ref 13–17)
IMM GRANULOCYTES BLD QL AUTO: 0.4 %
LYMPHOCYTES # SPEC AUTO: 0.91 X 10*3/UL (ref 0.9–5)
LYMPHOCYTES NFR SPEC AUTO: 6.7 %
MCH RBC QN AUTO: 28.8 PG (ref 27–32)
MCHC RBC AUTO-ENTMCNC: 33.7 G/DL (ref 32–37)
MCV RBC AUTO: 85.4 FL (ref 80–97)
MONOCYTES # BLD AUTO: 0.5 X 10*3/UL (ref 0.2–1)
MONOCYTES NFR BLD AUTO: 3.7 %
NEUTROPHILS # BLD AUTO: 12.15 X 10*3/UL (ref 1.8–7.7)
NEUTROPHILS NFR BLD AUTO: 89.1 %
NRBC BLD AUTO-RTO: 0 /100 WBCS (ref 0–0)
PLATELET # BLD AUTO: 178 X 10*3/UL (ref 140–440)
POTASSIUM SERPL-SCNC: 3.9 MMOL/L (ref 3.5–5.5)
SODIUM SERPL-SCNC: 138 MMOL/L (ref 135–145)
WBC # BLD AUTO: 13.62 X 10*3/UL (ref 4.5–10)

## 2023-04-11 PROCEDURE — 0PU407Z SUPPLEMENT THORACIC VERTEBRA WITH AUTOLOGOUS TISSUE SUBSTITUTE, OPEN APPROACH: ICD-10-PCS

## 2023-04-11 PROCEDURE — 0SP00AZ REMOVAL OF INTERBODY FUSION DEVICE FROM LUMBAR VERTEBRAL JOINT, OPEN APPROACH: ICD-10-PCS

## 2023-04-11 PROCEDURE — 0QU007Z SUPPLEMENT LUMBAR VERTEBRA WITH AUTOLOGOUS TISSUE SUBSTITUTE, OPEN APPROACH: ICD-10-PCS

## 2023-04-11 PROCEDURE — 00NX0ZZ RELEASE THORACIC SPINAL CORD, OPEN APPROACH: ICD-10-PCS

## 2023-04-11 PROCEDURE — 30233N0 TRANSFUSION OF AUTOLOGOUS RED BLOOD CELLS INTO PERIPHERAL VEIN, PERCUTANEOUS APPROACH: ICD-10-PCS

## 2023-04-11 PROCEDURE — 0RGA0AJ FUSION OF THORACOLUMBAR VERTEBRAL JOINT WITH INTERBODY FUSION DEVICE, POSTERIOR APPROACH, ANTERIOR COLUMN, OPEN APPROACH: ICD-10-PCS

## 2023-04-11 PROCEDURE — 00NY0ZZ RELEASE LUMBAR SPINAL CORD, OPEN APPROACH: ICD-10-PCS

## 2023-04-11 PROCEDURE — 07DT0ZZ EXTRACTION OF BONE MARROW, OPEN APPROACH: ICD-10-PCS

## 2023-04-11 RX ADMIN — DEXAMETHASONE SODIUM PHOSPHATE SCH MG: 4 INJECTION, SOLUTION INTRAMUSCULAR; INTRAVENOUS at 02:42

## 2023-04-11 RX ADMIN — DEXAMETHASONE SODIUM PHOSPHATE SCH MG: 4 INJECTION, SOLUTION INTRAMUSCULAR; INTRAVENOUS at 06:09

## 2023-04-11 RX ADMIN — CEFAZOLIN SCH MLS/HR: 330 INJECTION, POWDER, FOR SOLUTION INTRAMUSCULAR; INTRAVENOUS at 13:17

## 2023-04-11 RX ADMIN — ENOXAPARIN SODIUM SCH: 40 INJECTION SUBCUTANEOUS at 08:16

## 2023-04-11 RX ADMIN — DEXAMETHASONE SODIUM PHOSPHATE SCH MG: 4 INJECTION, SOLUTION INTRAMUSCULAR; INTRAVENOUS at 11:19

## 2023-04-11 RX ADMIN — DEXAMETHASONE SODIUM PHOSPHATE SCH: 4 INJECTION, SOLUTION INTRAMUSCULAR; INTRAVENOUS at 16:32

## 2023-04-11 RX ADMIN — ATORVASTATIN CALCIUM SCH MG: 10 TABLET, FILM COATED ORAL at 08:57

## 2023-04-11 RX ADMIN — CYCLOBENZAPRINE HYDROCHLORIDE PRN MG: 10 TABLET, FILM COATED ORAL at 02:41

## 2023-04-11 RX ADMIN — HYDROMORPHONE HYDROCHLORIDE PRN MG: 1 INJECTION, SOLUTION INTRAMUSCULAR; INTRAVENOUS; SUBCUTANEOUS at 08:57

## 2023-04-11 RX ADMIN — NAPROXEN PRN MG: 250 TABLET ORAL at 02:59

## 2023-04-11 RX ADMIN — Medication SCH MG: at 08:57

## 2023-04-11 RX ADMIN — DEXAMETHASONE SODIUM PHOSPHATE SCH: 4 INJECTION, SOLUTION INTRAMUSCULAR; INTRAVENOUS at 23:28

## 2023-04-11 RX ADMIN — POTASSIUM CHLORIDE SCH MEQ: 20 TABLET, EXTENDED RELEASE ORAL at 08:57

## 2023-04-11 RX ADMIN — LOSARTAN POTASSIUM AND HYDROCHLOROTHIAZIDE SCH: 12.5; 5 TABLET ORAL at 09:11

## 2023-04-11 NOTE — P.OP
Date of Procedure: 04/11/23


Preoperative Diagnosis: 


Spinal cord injury at the level of the T10, incomplete


Severe spinal stenosis T10 11 T11 12 T12-L1


History of prior fusion L1 to S1 with retained hardware


Bilateral lower extremity weakness


Myelopathy


Urinary retention


Inability to ambulate


Morbid obesity


History of fall


Postoperative Diagnosis: 


Same


Anesthesia: GETA


Pathology: none sent


Condition: stable


Disposition: PACU


Description of Procedure: 


BRIEF OPERATIVE NOTE





Preoperative Diagnosis:Spinal cord injury at the level of the T10, incomplete


Severe spinal stenosis T10 11 T11 12 T12-L1


History of prior fusion L1 to S1 with retained hardware


Bilateral lower extremity weakness


Myelopathy


Urinary retention


Inability to ambulate


Morbid obesity


History of fall


Postoperative Diagnosis:Spinal cord injury at the level of the T10, incomplete


Severe spinal stenosis T10 11 T11 12 T12-L1


History of prior fusion L1 to S1 with retained hardware


Bilateral lower extremity weakness


Myelopathy


Urinary retention


Inability to ambulate


Morbid obesity


History of fall


Procedure: CT-guided posterior lateral decompression and fusion T 1011 T11 12 

T12-L1 


   Use of [employer] CT-guided navigation system for fusion and instrumentation from 

T12-L1 


   Removal of deep hardware L1 and L2 


   Wide Laminectomy and decompression T10 11 T11 12 and T12-L1


   Harvesting of local autogenous bone graft


   Harvesting of bone marrow aspirate to supplement the bone graft for fusion


                    


                  Use of Cell Saver


                  Use of bone graft extenders


                  Use of neuro monitoring 


Surgeon: Dr. Townsend


Assistant: Lexx Montemayor is present throughout the entire the case 

persistence during positioning, dissection, exposure, visualization, and all 

crucial elements of the case as well as closure.


Anesthesia: General anesthesia Dr. Caruso


Estimated blood loss: Approximately 1600 mL with 600 given back through Cell 

Saver


Complications: None apparent


Components implanted: Liss K2M Yemi pedicle screw system with use of 8 

screws measuring 6.5 and 7.5 mm in diameter and 2 rods, we removed Solera screws

4 with rods, we also used bone graft mesh and bone graft extenders with DBM and

bone putty to supplemental local autogenous bone graft


Disposition: To recovery room in good stable condition.





OPERATIVE INDICATIONS





The patient has had long-standing issues in their lower back and lower 

extremities.  In the past patient has been through reportedly 15 different spine

surgeries.  He has had a prior fusion of L1 to S1 in the past his most recent 

surgery in approximately 2016.  Approximately 6 weeks ago the patient sustained 

a fall onto his back and had worsening symptoms at his back and his lower 

extremities.  He continue to have some worsening in his lower extremities and 

some worsening weakness in his lower extremity is with recurrent falls.  He was 

having inability to ambulate and presented to the hospital and was having 

significant weakness at his lower extremity is worse on the left than the right 

and was having evidence of urinary retention.  He had further evaluation we felt

that he had cauda equina type symptoms with possible spinal cord injury 

myelopathy.  He was admitted in treating him aggressively with conservative 

treatment while obtaining further workup.  The patient has been through 

conservative treatment.  He was found have severe stenosis most severe at T10 11

with significant stenosis T11 12 and T12-L1.  His fusion appeared to be stable 

from L1 S1.  The severe stenosis correlated well with his back and lower ext

remity symptoms we felt he had a significant spinal cord injury causing his 

weakness due to his recurrent falls.  We discussed various treatment options 

including surgery, and the patient wishes to proceed with surgery We discussed 

the risk, patient's alternatives and benefits of surgery including but not 

limited to, risk of bleeding risk of infection, risk of need for further 

surgery, risk of decreased, loss of motion, muscle function, malunion nonunion, 

hardware failure, nerve damage, paralysis, heart attack, blindness and death.  

Patient understood that he had significant issues and weakness which may not 

resolve despite aggressive treatment and even despite surgical intervention.





OPERATIVE SUMMARY





After discussing all the risks, patient alternatives and benefits at length, the

patient elected to proceed with surgical intervention, signed informed consent, 

and presented for their procedure.  The patient was seen and examined in the 

preoperative holding area and the surgical site was marked.  The patient was 

given antibiotics and brought to the operating room.





The patient was sedated and intubated by anesthesia in standard fashion.  The 

patient was positioned on to the operating room table in a prone position on the

appropriate frame which was well-padded and well molded.  We were careful to pad

any bony prominences and pressure points. We were careful to maintain the bailey

ent's cervical spine and good neutral alignment and position throughout.





The patient was prepped and draped in a normal standard fashion.  An appropriate

timeout and keystone protocol performed.  We were able to proceed with the 

surgery.  The local wound area was infiltrated with local anesthetic. 





An incision was made at the midline longitudinally over the appropriate levels 

from T 11 to L2 utilizing the prior incision over T12 to L2.  Dissection was 

taken down subcutaneously to the level of the fascia which was split midline.  

Patient had obvious morbid obesity and further care was taken throughout the 

case to account for positioning dissection exposure and closure due to this.  

Dissection was taken over the lamina bilaterally over the facet joints and to 

the transverse processes.  Intraoperative x-ray was taken which showed a marker 

at the appropriate level revealing the hardware at L1 to in the appropriate 

levels up to T10 .  


With his expose I was able to anchor a guidance device from the [employer] CT 

guidance navigation system to the spinous process of T12 and was anchored 

appropriately.  We draped appropriately and did a spin intraoperatively with the

navigation system.  With the appropriate level positively confirmed, we were 

able to proceed with placement of the pedicle holes and screws.  The patient had

all their twitches back.  The wound was copiously irrigated and suctioned dry as

had been done periodically throughout the case.  Screw holes were established 

similarly at each level. I was able to use a Jamshidi needle with guidance to 

establish the appropriate trajectory with CT guidance at T10 T11 T12 

bilaterally.  As able place guidewires appropriately with guidance system to 

measure appropriate.  With this intact as were then taken with fluoroscopy to 

confirm the appropriate levels and position as able place cannulated Screws over

Each of the Guidewires at T10-T11 and at T12 Bilaterally in Good Alignment Good 

Position with Excellent Purchase.   I was able to use these holes to place the 

appropriate size screw and good alignment and good position with good bony 

purchase.  When the screws were inserted there were stimulated, and found to 

have no stimulation at 20 mA.





Images were again taken which showed excellent alignment and position of the 

hardware at T10 to T12.  And then turned my attention to removal of the hardware

at L1 and L2 in order to extend the fusion to the prior established levels.  I 

had to chisel out significant amount of bone around the screws but I was able to

remove the Screws rods and 4 screws appropriately.  There are checked and found 

be in total.  I was unable to really use the screws at L2 by tapping the screws 

and placing a 7.5 mm screw at L2 bilaterally at the pedicles.  These had 

excellent position and did not have a stimulation at 20 mA





I was able to turn my attention to the decompression decompression was performed

with a combination of rongeurs, curettes, Kerrison rongeurs and a ball-tip 

feeler.  All of the bone that was removed was stripped and morcellized for use 

as autogenous bone graft later in the case. There is no to severe stenosis 

particular T10 11 which was remedied with decompression and laminectomy.  There 

is severe stenosis at T11 12 T12-L1 which was also remedied with the 

decompression.   I was able to obtain good central decompression as well as wide

bilateral foraminal decompression.  There is no evidence of dural tear or leak. 

Good hemostasis was maintained.  The wound was irrigated and suctioned dry.











With the hardware intact, intraoperative x-ray was again taken which showed good

alignment and position of the hardware at the appropriate levels  From T 10 and 

L2.  We were then able to measure, contour and place the rods and appropriate 

hardware bilaterally  An T10 to L2.  I was able to place capcrews, tighten  them

down, and torque them off appropriately.  With this intact I was able to place 

the local otitis bone graft with additional bone graft enhancer as necessary 

into the posterior lateral gutters bilaterally.  With the bone graft intact, a 

stable construct, and good decompression at the appropriate levels, we were able

to proceed with closure.  Good hemostasis was maintained.  There is no evidence 

of dural tear or leak.  The fascia was closed for a watertight closure.  The 

subcutaneous tissue was closed over a superficial drain.  The subcuticular tiss

ue was closed with absorbable suture.  The wound was cleaned and dried and 

dressed with the appropriate dressing.  The drapes were broken down.  The 

patient was gently rolled back onto their hospital bed being careful to maintain

their cervical spine and good neutral alignment and position.  They were woken 

up by anesthesia, extubated, and brought to the recovery room in good stable 

condition.





The patient will be admitted to the hospital for appropriate postoperative care,

medical management and monitoring.  We will continue to follow them closely 

about the postoperative course.

## 2023-04-11 NOTE — XR
EXAMINATION TYPE: XR thoracic spine 2V

 

DATE OF EXAM: 4/11/2023

 

CLINICAL HISTORY: pain

 

TECHNIQUE: Frontal, lateral, and swimmer's view of thoracic spine are obtained.  

 

COMPARISON: None.

 

FINDINGS: Thoracic spine show satisfactory alignment without evidence of acute fracture or dislocatio
n.  Vertebral body heights are preserved. Mild multilevel degenerative disc space narrowing and spond
ylosis .  Visualized ribs are unremarkable.   

 

IMPRESSION: No acute fracture or dislocation is seen in the thoracic spine.  ICD 10 NO FRACTURE, INIT
IAL EVALUATION

## 2023-04-11 NOTE — P.PN
Progress Note - Text


Progress Note Date: 04/11/23





Orthopedic spine:





History of present illness:





Patient is a very pleasant 67-year-old male who is seen and examined at bedside.

 Evaluation of his thoracic and lumbar spines.  He has had lumbar and thoracic 

MRI imaging performed.  This has been reviewed by myself and Dr. Eddie Townsend.  





Patient has continued to experience significant lower extremity weakness which 

has been worsening over the past month.  He states he is currently unable to 

ambulate independently.  Last week he was able to present to his doctor's 

appointment with a walker.  He feels his left lower extremity has continued to 

worsen as compared to yesterday.  He has bilateral lower extremity weakness 

greater on the left than the right.  He is not complaining of significant lower 

extremity pain but complains of inability to use his lower extremities.  His 

back pain has been worsening is currently 8/10.





Reviewing of thoracic and lumbar MR imaging showed evidence of stenosis at the 

lower thoracic spine.  Patient is currently scheduled to undergo surgical 

intervention today with a T10-T11, T11-12, and T12-L1 decompression and fusion. 

He states he is ready to proceed forward with surgical intervention today.





Patient is currently being seen and examined by medicine.  Patient will need 

medical clearance.  Naproxen is currently on hold.  Patient's other medical 

diagnoses include essential hypertension, hyperlipidemia, and morbid obesity.





After having difficulty urinary retention, Clancy catheter was placed.  This 

continues to be intact.





He is currently nothing by mouth status in anticipation for surgical 

intervention later today.








Physical exam:





Patient is awake, alert, and oriented 3


Vital signs stable


Examination of thoracic and lumbar spine reveals skin is intact with no 

abrasions, lacerations, or bruises; no erythema, purulence or signs of infection


Evidence of a large midline incision across the entire lumbar spine


Evidence of significant bruising at the upper buttock status post fall


Profound left lower extremity weakness throughout range of motion; patient is 

unable to lift left lower extremity off the bed or perform active dorsiflexion 

or plantarflexion


Right lower extremity weakness; patient is unable to lift right lower extremity 

off the bed


Patient is able to perform some dorsiflexion, plantarflexion, and extensor 

hallucis longus on the right


No signs or symptoms of DVT; no calf pain


Clancy catheter intact








Pertinent studies:





X-rays of the thoracic spine and lumbar spine taken on 04/11/2023: Evidence of 

previous fusions with retained rods and screws at L1-2 and L5-S1; evidence of 

interbody device at L2-3 and L5-S1; evidence of S1 left-sided pedicle screw 

fracture; overall alignment of the thoracic spine appears to be fairly well 

maintained; lower thoracic degenerative change with degenerative disc disease 

and anterior osteophytic spurring; no obvious compression fracture deformity





MRI of the thoracic spine and lumbar spine taken on 04/10/2023: T10-11 herniated

nuclear pulposus and facet arthropathy with severe central canal stenosis and 

neural foraminal stenosis was some signal abnormality; T11-12 broad-based disc 

bulging and facet hypertrophy with severe right foraminal stenosis and moderate 

central stenosis; T12-L1 disc bulging and facet hypertrophy with mild central 

canal stenosis and bilateral foraminal narrowing; Evidence of previous fusions 

with retained rods and screws at L1-2 and L5-S1; evidence of interbody device at

L2-3 and L5-S1;








Assessment:





T10-11 severe central canal stenosis and foraminal stenosis with cord signal 

change


T11-12 moderate central canal stenosis and severe right foraminal stenosis


T12-L1 mild central canal stenosis and bilateral neural foraminal narrowing


Thoracolumbar pain


Inability to ambulate


Profound lower extremity weakness greater on the left than the right


History of multiple lumbar surgeries times approximately 15


L1-2 and L5-S1 retained lumbar hardware with rods and screws


L2-3 and L5-S1 retained interbody device


Left S1 pedicle screw fracture


Urinary retention with Clancy catheter intact


Essential hypertension


Obesity


Hyperlipidemia








Plan:





1.  Patient has been experiencing profound lower extremity weakness greater on 

the left than the right which has significantly worsened over the past 4 weeks. 

He is decreasing ambulating without the assistance of a walking aid.  Last week 

he was able to a assistance of a walker.  He is no longer able to ambulate 

independently.  His legs will not sustain his weight.  He has significant diff

iculty with any active range of motion of the left lower extremity the bedside 

this morning.  He has limited active range of motion the right lower extremity.





He has a history of 15 surgeries previously at his lumbar spine with retained 

hardware.  X-ray and MRI imaging has been performed of the thoracic and lumbar 

spines.  He does have evidence of multilevel stenosis at T10-11, T11-12, and 

T12-L1.  We did discuss in detail the severity of his symptoms and his 

significant of his imaging.  His symptoms have worsened quickly and he is also 

experiencing urinary retention.  Clancy catheter is currently intact.  Given his 

significant findings on MRI imaging and his symptoms, we feel the patient should

proceed forward with surgical intervention at his thoracolumbar spine to provide

him the best opportunity have some improvement of his symptoms. We did discuss 

with his severe stenosis and cord change that surgical intervention with 

decompression and fusion does provide him with the best opportunity have some 

improvement but that given the severity of his symptoms with his cord signal 

change, that even with surgical intervention it is most likely his symptoms will

not return to his baseline.  





Patient would like to proceed forward with surgical intervention today.  Surgery

is currently scheduled for approximate 4:00 PM.  Patient will need clearance by 

medicine.  Patient is currently nothing by mouth status in anticipation for 

surgical intervention today.  Patient may remain in bed today.  Clancy catheter 

remained intact.





We did discuss the proposed surgical intervention is a posterior T10-11, T11-12,

and T12-L1 decompression and fusion with possible removal of hardware.  Given 

his findings on MRI imaging and his symptoms, patient will like to proceed for 

surgical intervention as he feels his symptoms are severe under quickly 

worsening and are not controllable.





I discussed these issues with the patient at length and I answered all of their 

questions to the best of my ability and the patient understands.  I discussed 

the risk of surgical intervention and alternative treatment options.  The risk 

of surgical intervention was explained to the patient in detail including but 

not limited to risk of bleeding, risk of infection, risk and need for further 

surgery, risk of decreased loss of motion of function, malunion, nonunion, 

hardware failure, nerve damage, paralysis, heart attack, death, as well as the 

fact that surgery may not alleviate her symptoms.  I answered all the patient's 

questions the best of my ability.  The patient would like to proceed forward 

with surgical intervention and will sign informed consent.





We will plan to add IV Dilaudid 1 mg IV P every 3 hours as needed for pain.  

Patient states he has taken this medication in the past without difficulty and 

is agreeable to take this medicine currently for pain control as he is unable to

currently take oral medications.  We will continue to hold oral pain medications

in anticipation for surgical intervention.





We also discussed patient will most likely need discharge to a rehabilitation 

facility at the time of discharge.  Patient is agreeable to this.

## 2023-04-11 NOTE — XR
EXAMINATION TYPE: XR lumbar spine 2 or 3V

 

DATE OF EXAM: 4/11/2023

 

CLINICAL HISTORY: pain

 

TECHNIQUE: Three views of the lumbar spine are submitted.

 

COMPARISON: 3/20/2016

 

FINDINGS:  

Decompressive laminectomy changes noted with radicular screws identified extending from L5 1 through 
L5-S1. Postoperative alignment is stable intervertebral spacer is in place. No fracture or malalignme
nt.

 

IMPRESSION:  

Appropriate postoperative alignment

## 2023-04-11 NOTE — P.PN
Progress Note - Text


Progress Note Date: 04/11/23





- Chief Complaint


Worsening lower back pain





- History of Present Illness





This is a very pleasant 67-year-old patient who follows with Dr. Jaden Richmond.


Patient has been on disability for 40 years.  Had work-related accident.  Since 

then patient had several surgeries seen many surgeons and has fused vertebra to 

the mid to lower spine and cervical spine.  He is not allowed to lift more than 

5 pounds.  Patient is sensation mid back off words and below that he has altered

sensation.  Patient is incontinent of urine and rhythm 2 minutes has to go.  

Patient a month ago had a fall and has become increasingly weak in the left leg.

 An has to drag his right leg.  Using a walker.  Has been has been getting worse

he decided to come in.  Looking at possible surgery.


04/11/2023: So the patient does morning.  Pending to go to surgery later today. 

Has a friend visiting.  No new issues.  Pain present.





Active Medications





Amlodipine Besylate (Amlodipine 2.5 Mg Tab)  2.5 mg PO DAILY [facility]


   Last Admin: 04/11/23 08:57 Dose:  2.5 mg


   


Atorvastatin Calcium (Atorvastatin 10 Mg Tab)  10 mg PO DAILY [facility]


   Last Admin: 04/11/23 08:57 Dose:  10 mg


   


Cyclobenzaprine HCl (Cyclobenzaprine 10 Mg Tab)  10 mg PO TID PRN


   PRN Reason: Muscle Spasm


   Last Admin: 04/11/23 02:41 Dose:  10 mg


   


Dexamethasone Sodium Phosphate (Dexamethasone Sod Phosphate 4 Mg/Ml 1 Ml Vial)  

4 mg IVP Q4H [facility]


   Last Admin: 04/11/23 16:32 Dose:  Not Given


   


Enoxaparin Sodium (Enoxaparin 40 Mg/0.4 Ml Syringe)  40 mg SQ DAILY [facility]


   Last Admin: 04/11/23 08:16 Dose:  Not Given


   


HCTZ/Losartan Potassium (Losartan-Hctz 50-12.5 Mg 1 Each Tab)  2 each PO DAILY S




   Last Admin: 04/11/23 09:11 Dose:  Not Given


   


Hydrochlorothiazide (Hydrochlorothiazide 25 Mg Tab)  25 mg PO DAILY [facility]


   Last Admin: 04/11/23 09:11 Dose:  Not Given


   


Hydromorphone HCl (Hydromorphone 1 Mg/Ml 1 Ml Syringe)  1 mg IVP Q3HR PRN


   PRN Reason: Pain


   Last Admin: 04/11/23 08:57 Dose:  1 mg


   


Sodium Chloride (Saline 0.9%)  1,000 mls @ 75 mls/hr IV .[id number] [facility]


   Last Admin: 04/11/23 13:17 Dose:  75 mls/hr


   


Magnesium Oxide (Magnesium Oxide 400 Mg Tab)  400 mg PO DAILY [facility]


   Last Admin: 04/11/23 08:57 Dose:  400 mg


   


Naloxone HCl (Naloxone 0.4 Mg/Ml 1 Ml Vial)  0.2 mg IV Q2M PRN


   PRN Reason: Opioid Reversal


Naproxen (Naproxen 250 Mg Tab)  250 mg PO TID PRN


   PRN Reason: Pain


   Last Admin: 04/11/23 02:59 Dose:  250 mg


   


Potassium Chloride (Potassium Chloride Er 20 Meq Tab.Er)  20 meq PO DAILY [facility]


   Last Admin: 04/11/23 08:57 Dose:  20 meq


   


Tamsulosin HCl (Tamsulosin 0.4 Mg Cap.Er.24h)  0.4 mg PO HS [facility]


   Last Admin: 04/10/23 20:47 Dose:  0.4 mg


   











Past medical history to include:


Muscle spasms, possibly neurogenic bladder, essential hypertension, 

hyperlipidemia, DJD,





Social history:


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





Physical examination:


VITAL SIGNS: 97.7, 99, 20, 152/83, 97% on room air


GENERAL: BMI 46.2, reclining bed not in distress.


EYES: Pupils equal.  Conjunctiva normal.


HEENT: External appearance of nose and ears normal, oral cavity grossly normal.


NECK: JVD not raised; masses not palpable.


HEART: First and second heart sounds are normal;  no edema.  


LUNGS: Respiratory rate normal; clear to auscultation.  


ABDOMEN: Soft,  nontender, liver spleen not palpable, no masses palpable.  Clancy

catheter 


PSYCH: Alert and oriented x3;  mood  and affect normal.  


MUSCULOSKELETAL:No Clubbing/cyanosis;muscles-grossly intact


NEUROLOGICAL: [Cranial nerves grossly intact; no facial asymmetry, decreased 

power in both lower extremity.  Decreased sensation








INVESTIGATIONS, reviewed in the clinical context:


April 11: White count 13.6 hemoglobin 14.6 platelets 178 potassium 3.9 

creatinine 0.9


White count 6.4 hemoglobin 15.9 platelets 150 sodium 138 potassium 3.7 BUN 23 

creatinine 0.79


UA: Negative


CT pelvis without contrast: BG each DG changes at sacrococcygeal junction.  No 

fracture


CT brain: Age-related changes


Knee x-ray: Unremarkable


CT thorax with lumbar spine without contrast: Multilevel foraminal stenosis 

throughout lumbar spine.  Especially at L3-L4 and L5-S1.  Also at L1-L2.


Chest x-ray film personally reviewed by me-unremarkable


EKG tracing personally reviewed by me-sinus rhythm.  Right bundle branch block.





Assessment and plan:





-Patient with work-related injury about 40 years ago.  Has had fusion of some 

cervical vertebra and in the back.  Patient had a fall a month ago.  Progressive

 increasing pain in the back and trouble walking walking.  Also bladder and 

urine has been affected bit worse than before: Slow to respond


Pending surgery by Dr. Vee from orthopedics.  Pain control.





-Leukocytosis from steroids





-Urine and bladder incontinence, some acute on chronic element


Clancy catheter





-Morbid obesity BMI 46.2


Weight loss measures





-Muscle spasm


Flexeril 10 mg when necessary





-Essential hypertension


Norvasc 2.5 mg, losartan hydrochlorothiazide 100/25,





-Hyperlipidemia


Crestor 5 mg





-Chronic gait dysfunction uses a walker at baseline.





-Perioperative cardiovascular risk assessment: Patient has underlying low 

exercise tolerance because of chronic back pain and disability.  Patient denies 

any active chest pain or shortness of breath.  We will do obese line 2-D 

echocardiogram.  Patient's had a moderate cardiovascular risk for surgery.  No 

contraindications to proceed with surgery.





Thank you Dr. Townsend

## 2023-04-11 NOTE — CA
Transthoracic Echo Report 

 Name: Amrit Caruso 

 MRN:    [id number] 

 Age:    67     Gender:     M 

 

 :    1955 

 Exam Date:     04/10/2023 13:59 

 Exam Location: [facility] 

 Ht (in):     71     Wt (lb):     350 

 Ordering Physician:        Damion Bee MD 

 Attending/Referring Phys: 

 Technician         Jono Gutierres RDCS 

 Procedure CPT: 

 Indications:       pre-op 

 

 Cardiac Hx: 

 Technical Quality:      Technically difficult study,  

                         parasternal views not well  

                         visualized. 

 Contrast 1:                                Total Dose (mL): 

 Contrast 2:                                Total Dose (mL): 

 

 MEASUREMENTS  (Male / Female) Normal Values 

 

 DOPPLER 

 AV Peak Velocity                  130.1 cm/s             

 AV Peak Gradient                  6.8 mmHg               

 LVOT Peak Velocity                141.4 cm/s             

 LVOT Peak Gradient                8.0 mmHg               

 MV Area PHT                       6.0 cm???                

 Mitral E Point Velocity           71.7 cm/s              

 Mitral A Point Velocity           95.4 cm/s              

 Mitral E to A Ratio               0.8                    

 MV Deceleration Time              127.4 ms               

 

 

 FINDINGS 

 Left Ventricle 

 Left ventricular cavity size normal. Left ventricular wall thickness normal.  

 Left ventricular ejection fraction is estimated at 55-60 %. Grade 1 diastolic  

 dysfunction. 

 

 Right Ventricle 

 Right ventricle not well visualized. Normal right ventricular global systolic  

 function. 

 

 Right Atrium 

 Normal right atrial size. 

 

 Left Atrium 

 Normal left atrial size. 

 

 Mitral Valve 

 No mitral regurgitation. 

 

 Aortic Valve 

 Aortic valve not well visualized. No aortic stenosis. No aortic regurgitation. 

 

 Tricuspid Valve 

 No tricuspid regurgitation. 

 

 Pulmonic Valve 

 Pulmonic valve not well visualized. 

 

 Pericardium 

 No pericardial or pleural effusion. Echo free space anterior to the right  

 ventricle likely represents a fat pad. 

 

 Aorta 

 Aortic root and proximal ascending aorta not well visualized. 

 

 CONCLUSIONS 

 Normal LV size and systolic function 

 Previewed by:  

 Dr. Kevin Grady MD 

 (Electronically Signed) 

 Final Date:      2023 17:15

## 2023-04-12 LAB
ANION GAP SERPL CALC-SCNC: 9 MMOL/L
BASOPHILS # BLD AUTO: 0 K/UL (ref 0–0.2)
BASOPHILS NFR BLD AUTO: 0 %
BUN SERPL-SCNC: 29 MG/DL (ref 9–20)
CALCIUM SPEC-MCNC: 8 MG/DL (ref 8.4–10.2)
CHLORIDE SERPL-SCNC: 104 MMOL/L (ref 98–107)
CO2 SERPL-SCNC: 26 MMOL/L (ref 22–30)
EOSINOPHIL # BLD AUTO: 0 K/UL (ref 0–0.7)
EOSINOPHIL NFR BLD AUTO: 0 %
ERYTHROCYTE [DISTWIDTH] IN BLOOD BY AUTOMATED COUNT: 4.34 M/UL (ref 4.3–5.9)
ERYTHROCYTE [DISTWIDTH] IN BLOOD: 13.9 % (ref 11.5–15.5)
GLUCOSE SERPL-MCNC: 172 MG/DL (ref 74–99)
HCT VFR BLD AUTO: 37.2 % (ref 39–53)
HGB BLD-MCNC: 12.5 GM/DL (ref 13–17.5)
LYMPHOCYTES # SPEC AUTO: 0.8 K/UL (ref 1–4.8)
LYMPHOCYTES NFR SPEC AUTO: 5 %
MCH RBC QN AUTO: 28.9 PG (ref 25–35)
MCHC RBC AUTO-ENTMCNC: 33.8 G/DL (ref 31–37)
MCV RBC AUTO: 85.6 FL (ref 80–100)
MONOCYTES # BLD AUTO: 0.7 K/UL (ref 0–1)
MONOCYTES NFR BLD AUTO: 5 %
NEUTROPHILS # BLD AUTO: 13.6 K/UL (ref 1.3–7.7)
NEUTROPHILS NFR BLD AUTO: 89 %
PLATELET # BLD AUTO: 146 K/UL (ref 150–450)
POTASSIUM SERPL-SCNC: 4.4 MMOL/L (ref 3.5–5.1)
SODIUM SERPL-SCNC: 139 MMOL/L (ref 137–145)
WBC # BLD AUTO: 15.2 K/UL (ref 3.8–10.6)

## 2023-04-12 RX ADMIN — CEFAZOLIN SCH MLS/HR: 330 INJECTION, POWDER, FOR SOLUTION INTRAMUSCULAR; INTRAVENOUS at 11:00

## 2023-04-12 RX ADMIN — CALCIUM CARBONATE SCH MG: 1250 SUSPENSION ORAL at 17:03

## 2023-04-12 RX ADMIN — HYDROCODONE BITARTRATE AND ACETAMINOPHEN PRN EACH: 10; 325 TABLET ORAL at 23:52

## 2023-04-12 RX ADMIN — CEFAZOLIN SCH: 330 INJECTION, POWDER, FOR SOLUTION INTRAMUSCULAR; INTRAVENOUS at 05:38

## 2023-04-12 RX ADMIN — ATORVASTATIN CALCIUM SCH MG: 10 TABLET, FILM COATED ORAL at 09:03

## 2023-04-12 RX ADMIN — LOSARTAN POTASSIUM AND HYDROCHLOROTHIAZIDE SCH EACH: 12.5; 5 TABLET ORAL at 09:03

## 2023-04-12 RX ADMIN — HYDROCODONE BITARTRATE AND ACETAMINOPHEN PRN EACH: 10; 325 TABLET ORAL at 00:50

## 2023-04-12 RX ADMIN — HYDROMORPHONE HYDROCHLORIDE PRN MG: 1 INJECTION, SOLUTION INTRAMUSCULAR; INTRAVENOUS; SUBCUTANEOUS at 20:17

## 2023-04-12 RX ADMIN — ENOXAPARIN SODIUM SCH MG: 40 INJECTION SUBCUTANEOUS at 09:02

## 2023-04-12 RX ADMIN — CYCLOBENZAPRINE HYDROCHLORIDE PRN MG: 10 TABLET, FILM COATED ORAL at 20:17

## 2023-04-12 RX ADMIN — Medication SCH MG: at 09:03

## 2023-04-12 RX ADMIN — POTASSIUM CHLORIDE SCH MEQ: 20 TABLET, EXTENDED RELEASE ORAL at 09:03

## 2023-04-12 RX ADMIN — CEFAZOLIN SCH: 330 INJECTION, POWDER, FOR SOLUTION INTRAMUSCULAR; INTRAVENOUS at 21:30

## 2023-04-12 RX ADMIN — CEFAZOLIN SCH: 330 INJECTION, POWDER, FOR SOLUTION INTRAMUSCULAR; INTRAVENOUS at 00:54

## 2023-04-12 RX ADMIN — HYDROMORPHONE HYDROCHLORIDE PRN MG: 1 INJECTION, SOLUTION INTRAMUSCULAR; INTRAVENOUS; SUBCUTANEOUS at 09:18

## 2023-04-12 RX ADMIN — HYDROCODONE BITARTRATE AND ACETAMINOPHEN PRN EACH: 10; 325 TABLET ORAL at 17:04

## 2023-04-12 RX ADMIN — HYDROMORPHONE HYDROCHLORIDE PRN MG: 1 INJECTION, SOLUTION INTRAMUSCULAR; INTRAVENOUS; SUBCUTANEOUS at 23:52

## 2023-04-12 RX ADMIN — CEFAZOLIN SCH MLS/HR: 330 INJECTION, POWDER, FOR SOLUTION INTRAMUSCULAR; INTRAVENOUS at 23:51

## 2023-04-12 RX ADMIN — HYDROCODONE BITARTRATE AND ACETAMINOPHEN PRN EACH: 10; 325 TABLET ORAL at 05:37

## 2023-04-12 RX ADMIN — HYDROCODONE BITARTRATE AND ACETAMINOPHEN PRN EACH: 10; 325 TABLET ORAL at 20:17

## 2023-04-12 RX ADMIN — CALCIUM CARBONATE SCH MG: 1250 SUSPENSION ORAL at 13:51

## 2023-04-12 RX ADMIN — TAMSULOSIN HYDROCHLORIDE SCH MG: 0.4 CAPSULE ORAL at 00:50

## 2023-04-12 RX ADMIN — CYCLOBENZAPRINE HYDROCHLORIDE PRN MG: 10 TABLET, FILM COATED ORAL at 09:32

## 2023-04-12 RX ADMIN — TAMSULOSIN HYDROCHLORIDE SCH MG: 0.4 CAPSULE ORAL at 20:38

## 2023-04-12 NOTE — P.PN
Progress Note - Text


Progress Note Date: 04/12/23





Orthopedic Spine





History of present illness:





Patient is a pleasant 67-year-old who is seen and examined at the bedside 

following thoracolumbar posterior lateral decompression and fusion performed 

yesterday.  Prior to surgical intervention, he was having profound weakness in 

his lower extremities greater on the left than right.  He has already been 

having increased strength with range of motion of his lower extremities.  He is 

able to perform some hip flexion, knee extension, dorsiflexion, and plantar 

flexion on the left which he was unable to do yesterday.





He is very happy with his progress initially postoperatively.  He does have some

thoracolumbar pain but states his pain has been adequately controlled.  He is 

not complaining of lower extremity leg pain.





Patient is currently being seen and examined by medicine.   Patient's other 

medical diagnoses include essential hypertension, hyperlipidemia, and morbid 

obesity.





After having difficulty urinary retention, Clancy catheter was placed.  This 

continues to be intact.  We will plan to discontinue the Clancy catheter once 

patient is able to increase his mobility and ambulation to see if he is able to 

void independently.  He does admit to chronic difficulty with urination 

following previous lumbar surgical interventions and states his difficulty was 

present prior to the onset of his recent urinary retention.





Currently, patient is still planning for discharge to a rehabilitation facility.

 We'll plan for evaluation with case management.








Physical Exam Lumbar Fusion:





Status post surgical day number 1


Surgical dressing remain intact over the thoracolumbar spine


Hemovac drain intact


Evidence of significant bruising at the upper buttock status post fall


Improvement of profound left lower extremity weakness throughout range of 

motion; patient is able to lift left lower extremity off the bed


Patient is able to perform hip flexion, dorsiflexion, plantarflexion on the 

right


Improved right lower extremity range motion was some weakness; patient is able 

to lift lower extremity off the bed independently


Patient is able to perform dorsiflexion, plantarflexion, and extensor hallucis 

longus on the right


No signs or symptoms of DVT; no calf pain


Clancy catheter intact








Pertinent studies:





X-rays of the thoracic spine and lumbar spine taken on 04/11/2023: Evidence of 

previous fusions with retained rods and screws at L1-2 and L5-S1; evidence of 

interbody device at L2-3 and L5-S1; evidence of S1 left-sided pedicle screw 

fracture; overall alignment of the thoracic spine appears to be fairly well 

maintained; lower thoracic degenerative change with degenerative disc disease 

and anterior osteophytic spurring; no obvious compression fracture deformity





MRI of the thoracic spine and lumbar spine taken on 04/10/2023: T10-11 herniated

nuclear pulposus and facet arthropathy with severe central canal stenosis and 

neural foraminal stenosis was some signal abnormality; T11-12 broad-based disc 

bulging and facet hypertrophy with severe right foraminal stenosis and moderate 

central stenosis; T12-L1 disc bulging and facet hypertrophy with mild central 

canal stenosis and bilateral foraminal narrowing; Evidence of previous fusions 

with retained rods and screws at L1-2 and L5-S1; evidence of interbody device at

L2-3 and L5-S1;








Assessment:





Status post open T10-T11, T11-12, and T12-L1 posterior lateral decompression and

fusion with removal of hardware at L1 and L2


T10-11 severe central canal stenosis and foraminal stenosis with cord signal 

change


T11-12 moderate central canal stenosis and severe right foraminal stenosis


T12-L1 mild central canal stenosis and bilateral neural foraminal narrowing


Thoracolumbar pain


Inability to ambulate


Improvement of profound lower extremity weakness greater on the left than the 

right


History of multiple lumbar surgeries times approximately 15


L1-2 and L5-S1 retained lumbar hardware with rods and screws


L2-3 and L5-S1 retained interbody device


Left S1 pedicle screw fracture


Urinary retention with Clancy catheter intact


Essential hypertension


Obesity


Hyperlipidemia








Plan:





1.  Ambulate as tolerated; work with Physical Therapy to increase mobilization; 

patient may utilize walker or other aid as needed to aid in ambulation


2.  Continue pain control with IV and oral medications; will plan to begin 

weaning the patient off of IV narcotic medication in anticipation for discharge 

to a rehabilitation facility in the next 1-2 days


3.  Dressings to remain intact with Optifoam; patient may shower with dressings 

intact


4.  Medical management can continue to manage patient for patient's other 

medical diagnoses


5.  We have discontinued Naprosyn.  Patient should avoid anti-inflammatory 

medications over the next 6 weeks postoperatively.


6.  We will plan for postoperative bracing.  Prescription is written, signed, 

and provided to case management to obtain an Aspen TLSO brace.  This brace will 

provide comfort support to the patient to help him increase his mobility and 

ambulation.  Once this brace is delivered and fitted appropriate, he should 

utilize his brace during ambulation and while working with physical therapy.


7.  After having difficulty with urinary retention prior to surgical 

intervention, Clancy catheter was placed.  This continues to be intact.  We will 

plan to discontinue the Clancy catheter once patient is able to increase his 

mobility and ambulation to see if he is able to void independently.  He does 

admit to chronic difficulty with urination following previous lumbar surgical 

interventions and states his difficulty was present prior to the onset of his 

recent urinary retention.


8.  Currently, patient is still planning for discharge to a rehabilitation 

facility.  We'll plan for evaluation with case management.


9.  Patient may continue to take prednisone 40 mg daily.  We may plan for a 

prednisone taper at the time of discharge.


10.  We will continue to follow the patient closely; depending on the patient's 

progress, we may plan for discharge to rehabilitation facility over the next 2-3

days


11.  Patient can follow-up with Lexx Soares PA-C or Dr. Eddie Townsend at 

[facility] in 2-3 weeks following discharge

## 2023-04-12 NOTE — FL
Intraoperative/procedural fluoroscopic services were provided. Total fluoroscopy time is 23 seconds w
ith a total of 0 submitted images to PACS. Please see the operative/procedural note for further detai
ls.

DAP: 82.46

## 2023-04-12 NOTE — P.PN
Progress Note - Text


Progress Note Date: 04/12/23





- Chief Complaint


Worsening lower back pain








Hospital course:


This is a very pleasant 67-year-old patient who follows with Dr. Jaden Richmond.


Patient has been on disability for 40 years.  Had work-related accident.  Since 

then patient had several surgeries seen many surgeons and has fused vertebra to 

the mid to lower spine and cervical spine.  He is not allowed to lift more than 

5 pounds.  Patient is sensation mid back off words and below that he has altered

sensation.  Patient is incontinent of urine and rhythm 2 minutes has to go.  

Patient a month ago had a fall and has become increasingly weak in the left leg.

 An has to drag his right leg.  Using a walker.  Has been has been getting worse

he decided to come in.  Looking at possible surgery.


04/11/2023: So the patient does morning.  Pending to go to surgery later today. 

Has a friend visiting.  No new issues.  Pain present.


04/12/2023: Patient underwear back surgery.   yesterday.  Sitting up in 

a chair.  Better movement of the left leg that was weaker prior to surgery.  

Hemovac in place.  Has some pain.  Clancy catheter.  Oral intake fair.  About 2 

30 mL output in the Hemovac in 12 hours.





Active Medications





Hydrocodone Bitart/Acetaminophen (Hydrocodone/Apap 10-325mg 1 Each Tab)  1 each 

PO Q4H PRN


   PRN Reason: Pain


   Last Admin: 04/12/23 20:17 Dose:  1 each


   


Amlodipine Besylate (Amlodipine 2.5 Mg Tab)  2.5 mg PO DAILY [facility]


   Last Admin: 04/12/23 09:03 Dose:  2.5 mg


   


Atorvastatin Calcium (Atorvastatin 10 Mg Tab)  10 mg PO DAILY [facility]


   Last Admin: 04/12/23 09:03 Dose:  10 mg


   


Benzocaine/Menthol (Benzocaine/Menthol Lozeng 1 Each Lozenge)  1 each MUCOUS MEM

Q4HR PRN


   PRN Reason: Sore Throat


   Last Admin: 04/12/23 02:29 Dose:  1 each


   


Bisacodyl (Bisacodyl 10 Mg Supp)  10 mg RECTAL DAILY PRN


   PRN Reason: Constipation


Calcium Carbonate/Glycine (Calcium Carbonate Liquid 500 Mg/5 Ml Cup)  500 mg PO 

TID-W/MEALS [facility]


   Last Admin: 04/12/23 17:03 Dose:  500 mg


   


Cyclobenzaprine HCl (Cyclobenzaprine 10 Mg Tab)  10 mg PO TID PRN


   PRN Reason: Muscle Spasm


   Last Admin: 04/12/23 20:17 Dose:  10 mg


   


Diazepam (Diazepam 5 Mg Tab)  5 mg PO QID PRN


   PRN Reason: Anxiety


Enoxaparin Sodium (Enoxaparin 40 Mg/0.4 Ml Syringe)  40 mg SQ DAILY [facility]


   Last Admin: 04/12/23 09:02 Dose:  40 mg


   


HCTZ/Losartan Potassium (Losartan-Hctz 50-12.5 Mg 1 Each Tab)  2 each PO DAILY 

[facility]


   Last Admin: 04/12/23 09:03 Dose:  2 each


   


Hydromorphone HCl (Hydromorphone 1 Mg/Ml 1 Ml Syringe)  1 mg IVP Q3HR PRN


   PRN Reason: Pain


   Last Admin: 04/12/23 20:17 Dose:  1 mg


   


Hydromorphone HCl (Hydromorphone 1 Mg/Ml 1 Ml Syringe)  1 mg IVP Q4HR PRN


   PRN Reason: Pain


   Last Admin: 04/12/23 12:20 Dose:  1 mg


   


Sodium Chloride (Saline 0.9%)  1,000 mls @ 75 mls/hr IV .[id number] [facility]


   Last Admin: 04/12/23 21:30 Dose:  Not Given


   


Sodium Chloride (Saline 0.9%)  1,000 mls @ 75 mls/hr IV .[id number] [facility]


   Last Admin: 04/12/23 11:00 Dose:  75 mls/hr


   


Cefazolin Sodium 3 gm/ Sodium (Chloride)  100 mls @ 200 mls/hr IVPB Q8HR [facility]; 

Protocol


   Stop: 04/13/23 00:29


   Last Admin: 04/12/23 15:10 Dose:  200 mls/hr


   


Magnesium Hydroxide (Magnesium Hydroxide 2,400 Mg/10 Ml Cup)  2,400 mg PO DAILY 

PRN


   PRN Reason: Constipation


Magnesium Oxide (Magnesium Oxide 400 Mg Tab)  400 mg PO DAILY [facility]


   Last Admin: 04/12/23 09:03 Dose:  400 mg


   


Naloxone HCl (Naloxone 0.4 Mg/Ml 1 Ml Vial)  0.2 mg IV Q2M PRN


   PRN Reason: Opioid Reversal


Ondansetron HCl (Ondansetron 4 Mg/2 Ml Vial)  4 mg IVP Q8HR PRN


   PRN Reason: Nausea And Vomiting


Potassium Chloride (Potassium Chloride Er 20 Meq Tab.Er)  20 meq PO DAILY [facility]


   Last Admin: 04/12/23 09:03 Dose:  20 meq


   


Prednisone (Prednisone 20 Mg Tab)  40 mg PO BID [facility]


   Last Admin: 04/12/23 20:38 Dose:  40 mg


   


Senna/Docusate Sodium (Sennosides-Docusate Sodium 1 Each Tab)  2 each PO DAILY 

PRN


   PRN Reason: Constipation


Tamsulosin HCl (Tamsulosin 0.4 Mg Cap.Er.24h)  0.4 mg PO HS [facility]


   Last Admin: 04/12/23 20:38 Dose:  0.4 mg


   

















Past medical history to include:


Muscle spasms, possibly neurogenic bladder, essential hypertension, 

hyperlipidemia, DJD,





Social history:


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





Physical examination:


VITAL SIGNS: 97.9, 90, 20, 146/82, 96% room air


GENERAL: BMI 46.2, up in a chair.


EYES: Pupils equal.  Conjunctiva normal.


HEENT: External appearance of nose and ears normal, oral cavity grossly normal.


NECK: JVD not raised; masses not palpable.


HEART: First and second heart sounds are normal;  no edema.  


LUNGS: Respiratory rate normal; clear to auscultation.  


ABDOMEN: Soft,  nontender, liver spleen not palpable, no masses palpable.  Clancy

catheter Hemovac.


PSYCH: Alert and oriented x3;  mood  and affect normal.  


MUSCULOSKELETAL:No Clubbing/cyanosis;muscles-grossly intact


NEUROLOGICAL: [Cranial nerves grossly intact; no facial asymmetry, able to lift 

his left leg.  Decreased sensation.











INVESTIGATIONS, reviewed in the clinical context:


April 12: White count 15.2 hemoglobin 12.5 platelets 146 potassium 4.4 

creatinine 1.02


April 11: White count 13.6 hemoglobin 14.6 platelets 178 potassium 3.9 

creatinine 0.9


White count 6.4 hemoglobin 15.9 platelets 150 sodium 138 potassium 3.7 BUN 23 

creatinine 0.79


UA: Negative


CT pelvis without contrast: BG each DG changes at sacrococcygeal junction.  No 

fracture


CT brain: Age-related changes


Knee x-ray: Unremarkable


CT thorax with lumbar spine without contrast: Multilevel foraminal stenosis 

throughout lumbar spine.  Especially at L3-L4 and L5-S1.  Also at L1-L2.


Chest x-ray film personally reviewed by me-unremarkable


EKG tracing personally reviewed by me-sinus rhythm.  Right bundle branch block.





Assessment and plan:





-Patient with work-related injury about 40 years ago.  Has had fusion of some 

cervical vertebra and in the back.  Patient had a fall a month ago.  Progressive

 increasing pain in the back and trouble walking walking.  Also bladder and 

urine has been affected bit worse than before: Slow to respond


Underwent lumbar surgery by  on April 11.  Hemovac in place.





-Leukocytosis from steroids





-Urine and bladder incontinence, some acute on chronic element


Clancy catheter





-Morbid obesity BMI 46.2


Weight loss measures





-Muscle spasm


Flexeril 10 mg when necessary





-Essential hypertension


Norvasc 2.5 mg, losartan hydrochlorothiazide 100/25,





-Hyperlipidemia


Crestor 5 mg





-Chronic gait dysfunction uses a walker at baseline.





Pain control.  Preop medications continued.  Follow H&H.  Discussed with 

patient.  Appetite fair.





Thank you Dr. Townsend

## 2023-04-13 RX ADMIN — LOSARTAN POTASSIUM AND HYDROCHLOROTHIAZIDE SCH EACH: 12.5; 5 TABLET ORAL at 08:30

## 2023-04-13 RX ADMIN — ENOXAPARIN SODIUM SCH MG: 40 INJECTION SUBCUTANEOUS at 08:30

## 2023-04-13 RX ADMIN — CEFAZOLIN SCH: 330 INJECTION, POWDER, FOR SOLUTION INTRAMUSCULAR; INTRAVENOUS at 19:36

## 2023-04-13 RX ADMIN — TAMSULOSIN HYDROCHLORIDE SCH MG: 0.4 CAPSULE ORAL at 21:19

## 2023-04-13 RX ADMIN — CALCIUM CARBONATE SCH MG: 1250 SUSPENSION ORAL at 13:06

## 2023-04-13 RX ADMIN — HYDROCODONE BITARTRATE AND ACETAMINOPHEN PRN EACH: 10; 325 TABLET ORAL at 06:45

## 2023-04-13 RX ADMIN — CEFAZOLIN SCH: 330 INJECTION, POWDER, FOR SOLUTION INTRAMUSCULAR; INTRAVENOUS at 01:01

## 2023-04-13 RX ADMIN — HYDROCODONE BITARTRATE AND ACETAMINOPHEN PRN EACH: 10; 325 TABLET ORAL at 13:42

## 2023-04-13 RX ADMIN — HYDROCODONE BITARTRATE AND ACETAMINOPHEN PRN EACH: 10; 325 TABLET ORAL at 23:37

## 2023-04-13 RX ADMIN — HYDROMORPHONE HYDROCHLORIDE PRN MG: 1 INJECTION, SOLUTION INTRAMUSCULAR; INTRAVENOUS; SUBCUTANEOUS at 08:34

## 2023-04-13 RX ADMIN — HYDROCODONE BITARTRATE AND ACETAMINOPHEN PRN EACH: 10; 325 TABLET ORAL at 03:35

## 2023-04-13 RX ADMIN — HYDROMORPHONE HYDROCHLORIDE PRN MG: 1 INJECTION, SOLUTION INTRAMUSCULAR; INTRAVENOUS; SUBCUTANEOUS at 23:41

## 2023-04-13 RX ADMIN — ATORVASTATIN CALCIUM SCH MG: 10 TABLET, FILM COATED ORAL at 08:30

## 2023-04-13 RX ADMIN — CALCIUM CARBONATE SCH MG: 1250 SUSPENSION ORAL at 06:09

## 2023-04-13 RX ADMIN — HYDROCODONE BITARTRATE AND ACETAMINOPHEN PRN EACH: 10; 325 TABLET ORAL at 19:24

## 2023-04-13 RX ADMIN — Medication SCH MG: at 08:30

## 2023-04-13 RX ADMIN — POTASSIUM CHLORIDE SCH MEQ: 20 TABLET, EXTENDED RELEASE ORAL at 08:30

## 2023-04-13 RX ADMIN — HYDROMORPHONE HYDROCHLORIDE PRN MG: 1 INJECTION, SOLUTION INTRAMUSCULAR; INTRAVENOUS; SUBCUTANEOUS at 05:29

## 2023-04-13 RX ADMIN — CYCLOBENZAPRINE HYDROCHLORIDE PRN MG: 10 TABLET, FILM COATED ORAL at 19:24

## 2023-04-13 RX ADMIN — CALCIUM CARBONATE (ANTACID) CHEW TAB 500 MG SCH MG: 500 CHEW TAB at 17:25

## 2023-04-13 NOTE — P.PN
Progress Note - Text


Progress Note Date: 04/13/23





Postoperative day #2





Patient is seen and examined today at bedside.  The patient feels his strength 

is significantly improved in his lower extremities articular at the left side.  

He was able to stand with therapy yesterday.  He will continue therapy today.  

He still has his Clancy intact and was started on Flomax on Monday.  He has years

of history of urinary difficulties  The patient has some pain around the 

surgical site as expected.  Pain is being controlled with medication.  She 

denies any chest pain shortness of breath.  His arms are somewhat sore from 

moving himself around.





Physical Exam





Afebrile with stable vital signs


Abdomen is soft nontender.  Chest has good excursion deep and space expiration


The incision site is clean dry and intact.  No erythema there is no purulence.  

The drain is intact.  Drainage is decreased but still about 160 per shift


Extremities have had improvement in strength at the lower extremities with 

neurologic improvement change from prior to surgery.  He still has weakness in 

his lower extremities but he is encouraged by his ability to mobilize his legs 

better


Calves and thighs were soft nontender without evidence of DVT.





Assessment/Plan





Postoperative day #2 status post open decompression and fusion T10 to L1 for his

spinal cord injury with severe stenosis T10 11 T11 12 T12-L1 with lower 

extremity weakness and inability to ambulate





Patient is progressing well from the surgery with improvement in his lower 

extremity strength and in terms of his pain control.  He had profound weakness 

in his lower extremities and he has been making progress and we will take 

considerable amount of time before we can see how the strength in neurologic f

unction can't continue to improve.  So far we should continue to give him to 

mobilize with physical therapy.  He is continuing his pain management 

appropriately.  He will likely need rehab and potentially be transferred to 

rehab Friday or Saturday.





The patient still has his Clancy catheter intact.  He's was started on Flomax on 

Monday.  He has over 8 years of difficulty with his urination but we need to see

if he is able to urinate better on his own and will discontinue the Clancy today.







His Hemovac drain is still draining though it is slowing down appropriately.  As

long as the drain is and I would like him to have prophylactic IV antibiotics 

and will order this.  I think we can discontinue the drain tomorrow morning


We will continue to increase the patient's mobilization with therapy.  


We will continue pain control with oral or IV medications.  We'll continue to 

follow patient closely.

## 2023-04-14 LAB
ERYTHROCYTE [DISTWIDTH] IN BLOOD BY AUTOMATED COUNT: 4.39 M/UL (ref 4.3–5.9)
ERYTHROCYTE [DISTWIDTH] IN BLOOD: 13.6 % (ref 11.5–15.5)
HCT VFR BLD AUTO: 36.4 % (ref 39–53)
HGB BLD-MCNC: 13 GM/DL (ref 13–17.5)
MCH RBC QN AUTO: 29.6 PG (ref 25–35)
MCHC RBC AUTO-ENTMCNC: 35.7 G/DL (ref 31–37)
MCV RBC AUTO: 82.9 FL (ref 80–100)
PLATELET # BLD AUTO: 136 K/UL (ref 150–450)
WBC # BLD AUTO: 12.7 K/UL (ref 3.8–10.6)

## 2023-04-14 RX ADMIN — SODIUM FERRIC GLUCONATE COMPLEX SCH MLS/HR: 12.5 INJECTION INTRAVENOUS at 11:53

## 2023-04-14 RX ADMIN — CALCIUM CARBONATE (ANTACID) CHEW TAB 500 MG SCH MG: 500 CHEW TAB at 06:12

## 2023-04-14 RX ADMIN — CYCLOBENZAPRINE HYDROCHLORIDE PRN MG: 10 TABLET, FILM COATED ORAL at 21:05

## 2023-04-14 RX ADMIN — POTASSIUM CHLORIDE SCH MEQ: 20 TABLET, EXTENDED RELEASE ORAL at 09:31

## 2023-04-14 RX ADMIN — CALCIUM CARBONATE (ANTACID) CHEW TAB 500 MG SCH MG: 500 CHEW TAB at 11:53

## 2023-04-14 RX ADMIN — HYDROCODONE BITARTRATE AND ACETAMINOPHEN PRN EACH: 10; 325 TABLET ORAL at 03:44

## 2023-04-14 RX ADMIN — CEFAZOLIN SCH MLS/HR: 330 INJECTION, POWDER, FOR SOLUTION INTRAMUSCULAR; INTRAVENOUS at 17:43

## 2023-04-14 RX ADMIN — CALCIUM CARBONATE (ANTACID) CHEW TAB 500 MG SCH MG: 500 CHEW TAB at 17:42

## 2023-04-14 RX ADMIN — CYCLOBENZAPRINE HYDROCHLORIDE PRN MG: 10 TABLET, FILM COATED ORAL at 08:51

## 2023-04-14 RX ADMIN — TAMSULOSIN HYDROCHLORIDE SCH MG: 0.4 CAPSULE ORAL at 21:04

## 2023-04-14 RX ADMIN — CEFAZOLIN SCH MLS/HR: 330 INJECTION, POWDER, FOR SOLUTION INTRAMUSCULAR; INTRAVENOUS at 10:30

## 2023-04-14 RX ADMIN — CEFAZOLIN SCH: 330 INJECTION, POWDER, FOR SOLUTION INTRAMUSCULAR; INTRAVENOUS at 02:50

## 2023-04-14 RX ADMIN — LOSARTAN POTASSIUM AND HYDROCHLOROTHIAZIDE SCH EACH: 12.5; 5 TABLET ORAL at 09:32

## 2023-04-14 RX ADMIN — ENOXAPARIN SODIUM SCH MG: 40 INJECTION SUBCUTANEOUS at 09:32

## 2023-04-14 RX ADMIN — Medication SCH MG: at 10:30

## 2023-04-14 RX ADMIN — CEFAZOLIN SCH MLS/HR: 330 INJECTION, POWDER, FOR SOLUTION INTRAMUSCULAR; INTRAVENOUS at 17:42

## 2023-04-14 RX ADMIN — ATORVASTATIN CALCIUM SCH MG: 10 TABLET, FILM COATED ORAL at 09:32

## 2023-04-14 RX ADMIN — HYDROMORPHONE HYDROCHLORIDE PRN MG: 1 INJECTION, SOLUTION INTRAMUSCULAR; INTRAVENOUS; SUBCUTANEOUS at 21:03

## 2023-04-14 RX ADMIN — HYDROCODONE BITARTRATE AND ACETAMINOPHEN PRN EACH: 10; 325 TABLET ORAL at 08:51

## 2023-04-14 NOTE — P.PN
Progress Note - Text


Progress Note Date: 04/13/23





- Chief Complaint


Worsening lower back pain








Hospital course:


This is a very pleasant 67-year-old patient who follows with Dr. Jaden Richmond.


Patient has been on disability for 40 years.  Had work-related accident.  Since 

then patient had several surgeries seen many surgeons and has fused vertebra to 

the mid to lower spine and cervical spine.  He is not allowed to lift more than 

5 pounds.  Patient is sensation mid back off words and below that he has altered

sensation.  Patient is incontinent of urine and rhythm 2 minutes has to go.  

Patient a month ago had a fall and has become increasingly weak in the left leg.

 An has to drag his right leg.  Using a walker.  Has been has been getting worse

he decided to come in.  Looking at possible surgery.


04/11/2023: So the patient does morning.  Pending to go to surgery later today. 

Has a friend visiting.  No new issues.  Pain present.


04/12/2023: Patient underwear back surgery.   yesterday.  Sitting up in 

a chair.  Better movement of the left leg that was weaker prior to surgery.  

Hemovac in place.  Has some pain.  Clancy catheter.  Oral intake fair.  About 2 

30 mL output in the Hemovac in 12 hours.


April 13: Up in a chair.  His reasonable control of pain.  Oral intake fair.  

160 mL output in the last 12 hours.  No nausea vomiting.  PTOT and the case





Active Medications





Hydrocodone Bitart/Acetaminophen (Hydrocodone/Apap 10-325mg 1 Each Tab)  1 each 

PO Q4H PRN


   PRN Reason: Pain


   Last Admin: 04/12/23 20:17 Dose:  1 each


   


Amlodipine Besylate (Amlodipine 2.5 Mg Tab)  2.5 mg PO DAILY [facility]


   Last Admin: 04/12/23 09:03 Dose:  2.5 mg


   


Atorvastatin Calcium (Atorvastatin 10 Mg Tab)  10 mg PO DAILY [facility]


   Last Admin: 04/12/23 09:03 Dose:  10 mg


   


Benzocaine/Menthol (Benzocaine/Menthol Lozeng 1 Each Lozenge)  1 each MUCOUS MEM

Q4HR PRN


   PRN Reason: Sore Throat


   Last Admin: 04/12/23 02:29 Dose:  1 each


   


Bisacodyl (Bisacodyl 10 Mg Supp)  10 mg RECTAL DAILY PRN


   PRN Reason: Constipation


Calcium Carbonate/Glycine (Calcium Carbonate Liquid 500 Mg/5 Ml Cup)  500 mg PO 

TID-W/MEALS [facility]


   Last Admin: 04/12/23 17:03 Dose:  500 mg


   


Cyclobenzaprine HCl (Cyclobenzaprine 10 Mg Tab)  10 mg PO TID PRN


   PRN Reason: Muscle Spasm


   Last Admin: 04/12/23 20:17 Dose:  10 mg


   


Diazepam (Diazepam 5 Mg Tab)  5 mg PO QID PRN


   PRN Reason: Anxiety


Enoxaparin Sodium (Enoxaparin 40 Mg/0.4 Ml Syringe)  40 mg SQ DAILY [facility]


   Last Admin: 04/12/23 09:02 Dose:  40 mg


   


HCTZ/Losartan Potassium (Losartan-Hctz 50-12.5 Mg 1 Each Tab)  2 each PO DAILY 

[facility]


   Last Admin: 04/12/23 09:03 Dose:  2 each


   


Hydromorphone HCl (Hydromorphone 1 Mg/Ml 1 Ml Syringe)  1 mg IVP Q3HR PRN


   PRN Reason: Pain


   Last Admin: 04/12/23 20:17 Dose:  1 mg


   


Hydromorphone HCl (Hydromorphone 1 Mg/Ml 1 Ml Syringe)  1 mg IVP Q4HR PRN


   PRN Reason: Pain


   Last Admin: 04/12/23 12:20 Dose:  1 mg


   


Sodium Chloride (Saline 0.9%)  1,000 mls @ 75 mls/hr IV .[id number] [facility]


   Last Admin: 04/12/23 21:30 Dose:  Not Given


   


Sodium Chloride (Saline 0.9%)  1,000 mls @ 75 mls/hr IV .[id number] [facility]


   Last Admin: 04/12/23 11:00 Dose:  75 mls/hr


   


Cefazolin Sodium 3 gm/ Sodium (Chloride)  100 mls @ 200 mls/hr IVPB Q8HR [facility]; 

Protocol


   Stop: 04/13/23 00:29


   Last Admin: 04/12/23 15:10 Dose:  200 mls/hr


   


Magnesium Hydroxide (Magnesium Hydroxide 2,400 Mg/10 Ml Cup)  2,400 mg PO DAILY 

PRN


   PRN Reason: Constipation


Magnesium Oxide (Magnesium Oxide 400 Mg Tab)  400 mg PO DAILY [facility]


   Last Admin: 04/12/23 09:03 Dose:  400 mg


   


Naloxone HCl (Naloxone 0.4 Mg/Ml 1 Ml Vial)  0.2 mg IV Q2M PRN


   PRN Reason: Opioid Reversal


Ondansetron HCl (Ondansetron 4 Mg/2 Ml Vial)  4 mg IVP Q8HR PRN


   PRN Reason: Nausea And Vomiting


Potassium Chloride (Potassium Chloride Er 20 Meq Tab.Er)  20 meq PO DAILY [facility]


   Last Admin: 04/12/23 09:03 Dose:  20 meq


   


Prednisone (Prednisone 20 Mg Tab)  40 mg PO BID [facility]


   Last Admin: 04/12/23 20:38 Dose:  40 mg


   


Senna/Docusate Sodium (Sennosides-Docusate Sodium 1 Each Tab)  2 each PO DAILY 

PRN


   PRN Reason: Constipation


Tamsulosin HCl (Tamsulosin 0.4 Mg Cap.Er.24h)  0.4 mg PO HS [facility]


   Last Admin: 04/12/23 20:38 Dose:  0.4 mg


   

















Past medical history to include:


Muscle spasms, possibly neurogenic bladder, essential hypertension, 

hyperlipidemia, DJD,





Social history:


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





Physical examination:


VITAL SIGNS: 98, 57, 16, 136/85, 97% room air


GENERAL: BMI 46.2, up in a chair.


EYES: Pupils equal.  Conjunctiva normal.


HEENT: External appearance of nose and ears normal, oral cavity grossly normal.


NECK: JVD not raised; masses not palpable.


HEART: First and second heart sounds are normal;  no edema.  


LUNGS: Respiratory rate normal; clear to auscultation.  


ABDOMEN: Soft,  nontender, liver spleen not palpable, no masses palpable.  Clancy

catheter, Hemovac.


PSYCH: Alert and oriented x3;  mood  and affect normal.  


MUSCULOSKELETAL:No Clubbing/cyanosis;muscles-grossly intact


NEUROLOGICAL: [Cranial nerves grossly intact; no facial asymmetry, able to lift 

his left leg.  Decreased sensation.











INVESTIGATIONS, reviewed in the clinical context:


April 12: White count 15.2 hemoglobin 12.5 platelets 146 potassium 4.4 

creatinine 1.02


April 11: White count 13.6 hemoglobin 14.6 platelets 178 potassium 3.9 

creatinine 0.9


White count 6.4 hemoglobin 15.9 platelets 150 sodium 138 potassium 3.7 BUN 23 

creatinine 0.79


UA: Negative


CT pelvis without contrast: BG each DG changes at sacrococcygeal junction.  No 

fracture


CT brain: Age-related changes


Knee x-ray: Unremarkable


CT thorax with lumbar spine without contrast: Multilevel foraminal stenosis 

throughout lumbar spine.  Especially at L3-L4 and L5-S1.  Also at L1-L2.


Chest x-ray film personally reviewed by me-unremarkable


EKG tracing personally reviewed by me-sinus rhythm.  Right bundle branch block.





Assessment and plan:





-Patient with work-related injury about 40 years ago.  Has had fusion of some 

cervical vertebra and in the back.  Patient had a fall a month ago.  Progressive

 increasing pain in the back and trouble walking walking.  Also bladder and 

urine has been affected bit worse than before: Slow to respond


Underwent lumbar surgery by  on April 11.  Hemovac in place.





-Acute postprocedure blood loss anemia, expected from surgery


Will give IV Ferrlecit-2 doses





-Leukocytosis from steroids





-Urine and bladder incontinence, some acute on chronic element


Clancy catheter





-Morbid obesity BMI 46.2


Weight loss measures





-Muscle spasm


Flexeril 10 mg when necessary





-Essential hypertension


Norvasc 2.5 mg, losartan hydrochlorothiazide 100/25,





-Hyperlipidemia


Crestor 5 mg





-Chronic gait dysfunction uses a walker at baseline.





Pain control.   Follow H&H.  Discussed with patient.  Using incentive 

spirometry.





Thank you Dr. Townsend

## 2023-04-14 NOTE — P.PN
Progress Note - Text


Progress Note Date: 04/14/23





- Chief Complaint


Worsening lower back pain








Hospital course:


This is a very pleasant 67-year-old patient who follows with Dr. Jaden Richmond.


Patient has been on disability for 40 years.  Had work-related accident.  Since 

then patient had several surgeries seen many surgeons and has fused vertebra to 

the mid to lower spine and cervical spine.  He is not allowed to lift more than 

5 pounds.  Patient is sensation mid back off words and below that he has altered

sensation.  Patient is incontinent of urine and rhythm 2 minutes has to go.  

Patient a month ago had a fall and has become increasingly weak in the left leg.

 An has to drag his right leg.  Using a walker.  Has been has been getting worse

he decided to come in.  Looking at possible surgery.


04/11/2023: So the patient does morning.  Pending to go to surgery later today. 

Has a friend visiting.  No new issues.  Pain present.


04/12/2023: Patient underwear back surgery.   yesterday.  Sitting up in 

a chair.  Better movement of the left leg that was weaker prior to surgery.  

Hemovac in place.  Has some pain.  Clancy catheter.  Oral intake fair.  About 2 

30 mL output in the Hemovac in 12 hours.


April 13: Up in a chair.  His reasonable control of pain.  Oral intake fair.  

160 mL output in the last 12 hours.  No nausea vomiting.  PTOT and the case


April 14: Clancy was DC'd today.  Decreased appetite.  Did pass flatness.  Having

abdominal cramps.  Discussed with patient.  Working with physical therapy





Active Medications





Hydrocodone Bitart/Acetaminophen (Hydrocodone/Apap 10-325mg 1 Each Tab)  1 each 

PO Q4H PRN


   PRN Reason: Pain


   Last Admin: 04/14/23 08:51 Dose:  1 each


   


Amlodipine Besylate (Amlodipine 2.5 Mg Tab)  2.5 mg PO DAILY [facility]


   Last Admin: 04/14/23 09:31 Dose:  2.5 mg


   


Atorvastatin Calcium (Atorvastatin 10 Mg Tab)  10 mg PO DAILY [facility]


   Last Admin: 04/14/23 09:32 Dose:  10 mg


   


Benzocaine/Menthol (Benzocaine/Menthol Lozeng 1 Each Lozenge)  1 each MUCOUS MEM

Q4HR PRN


   PRN Reason: Sore Throat


   Last Admin: 04/12/23 02:29 Dose:  1 each


   


Bisacodyl (Bisacodyl 10 Mg Supp)  10 mg RECTAL DAILY [facility]


   PRN Reason: Constipation


   Last Admin: 04/14/23 12:38 Dose:  10 mg


   


Calcium Carbonate/Glycine (Calcium Carbonate 500 Mg Chewable)  500 mg PO TID-

W/MEALS [facility]


   Last Admin: 04/14/23 17:42 Dose:  500 mg


   


Cyclobenzaprine HCl (Cyclobenzaprine 10 Mg Tab)  10 mg PO TID PRN


   PRN Reason: Muscle Spasm


   Last Admin: 04/14/23 21:05 Dose:  10 mg


   


Diazepam (Diazepam 5 Mg Tab)  5 mg PO QID PRN


   PRN Reason: Anxiety


Enoxaparin Sodium (Enoxaparin 40 Mg/0.4 Ml Syringe)  40 mg SQ DAILY [facility]


   Last Admin: 04/14/23 09:32 Dose:  40 mg


   


HCTZ/Losartan Potassium (Losartan-Hctz 50-12.5 Mg 1 Each Tab)  2 each PO DAILY 

[facility]


   Last Admin: 04/14/23 09:32 Dose:  2 each


   


Hydromorphone HCl (Hydromorphone 1 Mg/Ml 1 Ml Syringe)  1 mg IVP Q3HR PRN


   PRN Reason: Pain


   Last Admin: 04/14/23 21:03 Dose:  1 mg


   


Hydromorphone HCl (Hydromorphone 1 Mg/Ml 1 Ml Syringe)  1 mg IVP Q4HR PRN


   PRN Reason: Pain


   Last Admin: 04/12/23 12:20 Dose:  1 mg


   


Sodium Chloride (Saline 0.9%)  1,000 mls @ 75 mls/hr IV .[id number] [facility]


   Last Admin: 04/14/23 17:42 Dose:  75 mls/hr


   


Sodium Chloride (Saline 0.9%)  1,000 mls @ 75 mls/hr IV .[id number] [facility]


   Last Admin: 04/14/23 17:43 Dose:  75 mls/hr


   


Ferric Sodium Gluconate 125 mg (/ Sodium Chloride)  110 mls @ 100 mls/hr IVPB 

DAILY [facility]


   Stop: 04/15/23 10:05


   Last Admin: 04/14/23 11:53 Dose:  100 mls/hr


   


Magnesium Hydroxide (Magnesium Hydroxide 2,400 Mg/10 Ml Cup)  2,400 mg PO DAILY 

[facility]


   PRN Reason: Constipation


Magnesium Oxide (Magnesium Oxide 400 Mg Tab)  400 mg PO DAILY [facility]


   Last Admin: 04/14/23 10:30 Dose:  400 mg


   


Naloxone HCl (Naloxone 0.4 Mg/Ml 1 Ml Vial)  0.2 mg IV Q2M PRN


   PRN Reason: Opioid Reversal


Ondansetron HCl (Ondansetron 4 Mg/2 Ml Vial)  4 mg IVP Q8HR PRN


   PRN Reason: Nausea And Vomiting


   Last Admin: 04/13/23 18:44 Dose:  4 mg


   


Potassium Chloride (Potassium Chloride Er 20 Meq Tab.Er)  20 meq PO DAILY [facility]


   Last Admin: 04/14/23 09:31 Dose:  20 meq


   


Prednisone (Prednisone 20 Mg Tab)  40 mg PO BID [facility]


   Last Admin: 04/14/23 21:04 Dose:  40 mg


   


Senna/Docusate Sodium (Sennosides-Docusate Sodium 1 Each Tab)  2 each PO DAILY 

[facility]


   PRN Reason: Constipation


Tamsulosin HCl (Tamsulosin 0.4 Mg Cap.Er.24h)  0.4 mg PO HS [facility]


   Last Admin: 04/14/23 21:04 Dose:  0.4 mg


   




















Past medical history to include:


Muscle spasms, possibly neurogenic bladder, essential hypertension, 

hyperlipidemia, DJD,





Social history:


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





Physical examination:


VITAL SIGNS: 97.9, 94, 17, 133 Bicitra 6, 94% room air


GENERAL: BMI 46.2, laying in bed, uncomfortable


EYES: Pupils equal.  Conjunctiva normal.


HEENT: External appearance of nose and ears normal, oral cavity grossly normal.


NECK: JVD not raised; masses not palpable.


HEART: First and second heart sounds are normal;  no edema.  


LUNGS: Respiratory rate normal; clear to auscultation.  


ABDOMEN: Soft,  tenderness, liver spleen not palpable, no masses palpable.  

Clancy catheter removed, Hemovac.


PSYCH: Alert and oriented x3;  mood  and affect normal.  


MUSCULOSKELETAL:No Clubbing/cyanosis;muscles-grossly intact


NEUROLOGICAL: [Cranial nerves grossly intact; no facial asymmetry, able to lift 

his left leg.  Decreased sensation.











INVESTIGATIONS, reviewed in the clinical context:


April 14: White count 12.7 hemoglobin 13 platelets 136


April 12: White count 15.2 hemoglobin 12.5 platelets 146 potassium 4.4 

creatinine 1.02


April 11: White count 13.6 hemoglobin 14.6 platelets 178 potassium 3.9 

creatinine 0.9


White count 6.4 hemoglobin 15.9 platelets 150 sodium 138 potassium 3.7 BUN 23 

creatinine 0.79


UA: Negative


CT pelvis without contrast: BG each DG changes at sacrococcygeal junction.  No 

fracture


CT brain: Age-related changes


Knee x-ray: Unremarkable


CT thorax with lumbar spine without contrast: Multilevel foraminal stenosis 

throughout lumbar spine.  Especially at L3-L4 and L5-S1.  Also at L1-L2.


Chest x-ray film personally reviewed by me-unremarkable


EKG tracing personally reviewed by me-sinus rhythm.  Right bundle branch block.





Assessment and plan:





-Patient with work-related injury about 40 years ago.  Has had fusion of some 

cervical vertebra and in the back.  Patient had a fall a month ago.  Progressive

 increasing pain in the back and trouble walking walking.  Also bladder and 

urine has been affected bit worse than before: Slow to respond


Underwent lumbar surgery by  on April 11.  Hemovac in place.





-Acute postprocedure blood loss anemia, expected from surgery


 IV Ferrlecit-2 doses





-Leukocytosis from steroids





-Urine and bladder incontinence, some acute on chronic element


Clancy catheter removed





-Morbid obesity BMI 46.2


Weight loss measures





-Muscle spasm


Flexeril 10 mg when necessary





-Essential hypertension


Norvasc 2.5 mg, losartan hydrochlorothiazide 100/25,





-Hyperlipidemia


Crestor 5 mg





-Chronic gait dysfunction uses a walker at baseline.





-Bowel spasms.





Pain control.   Follow H&H.  Discussed with patient.  Using incentive 

spirometry.





Thank you Dr. Townsend

## 2023-04-14 NOTE — P.PN
Progress Note - Text


Progress Note Date: 04/14/23





Orthopedic Spine





History of present illness:





Patient is a pleasant 67-year-old who is seen and examined at the bedside 

following thoracolumbar posterior lateral decompression and fusion performed 

Wednesday..  Prior to surgical intervention, he was having profound weakness in 

his lower extremities greater on the left than right.  He continues to have some

increased strength with range of motion of his lower extremities.  He is able to

perform some hip flexion, knee extension, dorsiflexion, and plantar flexion on 

the left which he was unable to do prior to surgical intervention.





He did have some difficulty pain control overnight.  It is improving this mo

rning.  He was able to transfer to a bedside chair.





He was having some abdominal pain and gas.  This is improving.  He states he was

prescribed antacid medication.





Overall, his pain is fairly well controlled.  He does have some thoracolumbar 

pain but states his pain has been adequately controlled.  He is not complaining 

of lower extremity leg pain.





Patient is currently being seen and examined by medicine.   Patient's other 

medical diagnoses include essential hypertension, hyperlipidemia, and morbid 

obesity.





After having difficulty urinary retention, Clancy catheter was placed.  His Clancy

catheter was discontinued yesterday.  He states he has been able to void 

independently in a urinal.  He is urinating better postoperatively.  He does 

admit to chronic difficulty with urination following previous lumbar surgical 

interventions and states his difficulty was present prior to the onset of his 

recent urinary retention.





Currently, patient is planning for discharge to a rehabilitation facility.  He's

hopeful for discharge to a rehabilitation facility tomorrow.





Hemovac drain is intact.  He has had 120 mL of output over the last shift.  We 

will plan to keep this drain intact until tomorrow, 04/15/2023, or until the day

of discharge.  











Physical Exam Lumbar Fusion:





Status post surgical day number 3


Surgical dressing remain intact over the thoracolumbar spine


No pain on palpation over the abdomen.


Hemovac drain intact


Evidence of significant bruising at the upper buttock status post fall


Improvement of profound left lower extremity weakness throughout range of 

motion; patient is able to lift left lower extremity off the bed


Patient is able to perform hip flexion, dorsiflexion, plantarflexion on the 

right


Improved right lower extremity range motion was some weakness; patient is able 

to lift lower extremity off the bed independently


Patient is able to perform dorsiflexion, plantarflexion, and extensor hallucis 

longus on the right


No signs or symptoms of DVT; no calf pain


Clancy catheter has been discontinued








Pertinent studies:





X-rays of the thoracic spine and lumbar spine taken on 04/11/2023: Evidence of 

previous fusions with retained rods and screws at L1-2 and L5-S1; evidence of 

interbody device at L2-3 and L5-S1; evidence of S1 left-sided pedicle screw 

fracture; overall alignment of the thoracic spine appears to be fairly well 

maintained; lower thoracic degenerative change with degenerative disc disease 

and anterior osteophytic spurring; no obvious compression fracture deformity





MRI of the thoracic spine and lumbar spine taken on 04/10/2023: T10-11 herniated

nuclear pulposus and facet arthropathy with severe central canal stenosis and 

neural foraminal stenosis was some signal abnormality; T11-12 broad-based disc 

bulging and facet hypertrophy with severe right foraminal stenosis and moderate 

central stenosis; T12-L1 disc bulging and facet hypertrophy with mild central 

canal stenosis and bilateral foraminal narrowing; Evidence of previous fusions 

with retained rods and screws at L1-2 and L5-S1; evidence of interbody device at

L2-3 and L5-S1;








Assessment:





Status post open T10-T11, T11-12, and T12-L1 posterior lateral decompression and

fusion with removal of hardware at L1 and L2


T10-11 severe central canal stenosis and foraminal stenosis with cord signal 

change


T11-12 moderate central canal stenosis and severe right foraminal stenosis


T12-L1 mild central canal stenosis and bilateral neural foraminal narrowing


Thoracolumbar pain


Inability to ambulate


Improvement of profound lower extremity weakness greater on the left than the 

right


History of multiple lumbar surgeries times approximately 15


L1-2 and L5-S1 retained lumbar hardware with rods and screws


L2-3 and L5-S1 retained interbody device


Left S1 pedicle screw fracture


Urinary retention; Clancy catheter discontinued with urination status improving


Essential hypertension


Obesity


Hyperlipidemia








Plan:





1.  Ambulate as tolerated; work with Physical Therapy to increase mobilization; 

patient may utilize walker or other aid as needed to aid in ambulation


2.  Continue pain control with IV and oral medications; will plan to begin 

weaning the patient off of IV narcotic medication in anticipation for discharge 

to a rehabilitation facility hopefully tomorrow, 04/15/2023


3.  Dressings to remain intact with Optifoam; patient may shower with dressings 

intact


4.  Medical management can continue to manage patient for patient's other 

medical diagnoses


5.  We have discontinued Naprosyn.  Patient should avoid anti-inflammatory 

medications over the next 6 weeks postoperatively.


6.  We will plan for postoperative bracing.  Prescription is written, signed, a

nd provided to case management to obtain an TLSO brace.  This brace will provide

comfort support to the patient to help him increase his mobility and ambulation.

 Once this brace is delivered and fitted appropriate, he should utilize his 

brace during ambulation and while working with physical therapy.


7.  After having difficulty with urinary retention prior to surgical 

intervention, Clancy catheter has been discontinued.  Patient has been able to 

void better independently.   He does admit to chronic difficulty with urination 

following previous lumbar surgical interventions and states his difficulty was 

present prior to the onset of his recent urinary retention.


8.  Currently, patient is planning for discharge to a rehabilitation facility, 

hopefully tomorrow, 04/15/2023.


9.  Patient may continue to take prednisone 40 mg daily.  We may plan for a 

prednisone taper at the time of discharge.


10.  Hemovac drain is intact.  He has had 120 mL of output over the last shift. 

We will plan to keep this drain intact until tomorrow, 04/15/2023, or until the 

day of discharge.  


11.  We will continue to follow the patient closely; depending on the patient's 

progress, we may plan for discharge to rehabilitation facility over the next 2-3

days


12.  Patient can follow-up with Lexx Soares PA-C or Dr. Eddie Townsend at 

[facility] in 2-3 weeks following discharge

## 2023-04-15 VITALS — TEMPERATURE: 98.2 F | SYSTOLIC BLOOD PRESSURE: 151 MMHG | DIASTOLIC BLOOD PRESSURE: 79 MMHG | HEART RATE: 97 BPM

## 2023-04-15 VITALS — RESPIRATION RATE: 18 BRPM

## 2023-04-15 RX ADMIN — LOSARTAN POTASSIUM AND HYDROCHLOROTHIAZIDE SCH EACH: 12.5; 5 TABLET ORAL at 07:57

## 2023-04-15 RX ADMIN — Medication SCH MG: at 07:57

## 2023-04-15 RX ADMIN — ENOXAPARIN SODIUM SCH MG: 40 INJECTION SUBCUTANEOUS at 07:57

## 2023-04-15 RX ADMIN — CALCIUM CARBONATE (ANTACID) CHEW TAB 500 MG SCH MG: 500 CHEW TAB at 07:57

## 2023-04-15 RX ADMIN — CALCIUM CARBONATE (ANTACID) CHEW TAB 500 MG SCH MG: 500 CHEW TAB at 13:08

## 2023-04-15 RX ADMIN — HYDROCODONE BITARTRATE AND ACETAMINOPHEN PRN EACH: 10; 325 TABLET ORAL at 12:36

## 2023-04-15 RX ADMIN — ATORVASTATIN CALCIUM SCH MG: 10 TABLET, FILM COATED ORAL at 07:57

## 2023-04-15 RX ADMIN — CEFAZOLIN SCH: 330 INJECTION, POWDER, FOR SOLUTION INTRAMUSCULAR; INTRAVENOUS at 05:07

## 2023-04-15 RX ADMIN — POTASSIUM CHLORIDE SCH MEQ: 20 TABLET, EXTENDED RELEASE ORAL at 07:59

## 2023-04-15 RX ADMIN — SODIUM FERRIC GLUCONATE COMPLEX SCH MLS/HR: 12.5 INJECTION INTRAVENOUS at 09:48

## 2023-04-15 NOTE — P.PN
Progress Note - Text


Progress Note Date: 04/15/23





- Chief Complaint


Worsening lower back pain








Hospital course:


This is a very pleasant 67-year-old patient who follows with Dr. Jaden Richmond.


Patient has been on disability for 40 years.  Had work-related accident.  Since 

then patient had several surgeries seen many surgeons and has fused vertebra to 

the mid to lower spine and cervical spine.  He is not allowed to lift more than 

5 pounds.  Patient is sensation mid back off words and below that he has altered

sensation.  Patient is incontinent of urine and rhythm 2 minutes has to go.  

Patient a month ago had a fall and has become increasingly weak in the left leg.

 An has to drag his right leg.  Using a walker.  Has been has been getting worse

he decided to come in.  Looking at possible surgery.


04/11/2023: So the patient does morning.  Pending to go to surgery later today. 

Has a friend visiting.  No new issues.  Pain present.


04/12/2023: Patient underwear back surgery.   yesterday.  Sitting up in 

a chair.  Better movement of the left leg that was weaker prior to surgery.  

Hemovac in place.  Has some pain.  Clancy catheter.  Oral intake fair.  About 2 

30 mL output in the Hemovac in 12 hours.


April 13: Up in a chair.  His reasonable control of pain.  Oral intake fair.  

160 mL output in the last 12 hours.  No nausea vomiting.  PTOT and the case


April 14: Clancy was DC'd today.  Decreased appetite.  Did pass flatness.  Having

abdominal cramps.  Discussed with patient.  Working with physical therapy


April 15: Sitting up in a chair.  Did have a bowel movement.  Appetite 

improving.  Pain better.  Getting discharged to rehab.  Discussed

















Past medical history to include:


Muscle spasms, possibly neurogenic bladder, essential hypertension, 

hyperlipidemia, DJD,





Social history:


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





Physical examination:


VITAL SIGNS: 98.2, 97, 18, 151/79, 97% room air


GENERAL: BMI 46.2, up in chair


EYES: Pupils equal.  Conjunctiva normal.


HEENT: External appearance of nose and ears normal, oral cavity grossly normal.


NECK: JVD not raised; masses not palpable.


HEART: First and second heart sounds are normal;  no edema.  


LUNGS: Respiratory rate normal; clear to auscultation.  


ABDOMEN: Soft,  tenderness, liver spleen not palpable, no masses palpable.  

Clancy catheter removed, Hemovac.


PSYCH: Alert and oriented x3;  mood  and affect normal.  


MUSCULOSKELETAL:No Clubbing/cyanosis;muscles-grossly intact


NEUROLOGICAL: [Cranial nerves grossly intact; no facial asymmetry, able to lift 

his left leg.  Decreased sensation.











INVESTIGATIONS, reviewed in the clinical context:


April 14: White count 12.7 hemoglobin 13 platelets 136


April 12: White count 15.2 hemoglobin 12.5 platelets 146 potassium 4.4 

creatinine 1.02


White count 6.4 hemoglobin 15.9 platelets 150 sodium 138 potassium 3.7 BUN 23 

creatinine 0.79


UA: Negative


CT pelvis without contrast: BG each DG changes at sacrococcygeal junction.  No 

fracture


CT brain: Age-related changes


Knee x-ray: Unremarkable


CT thorax with lumbar spine without contrast: Multilevel foraminal stenosis 

throughout lumbar spine.  Especially at L3-L4 and L5-S1.  Also at L1-L2.


Chest x-ray film personally reviewed by me-unremarkable


EKG tracing personally reviewed by me-sinus rhythm.  Right bundle branch block.





Assessment and plan:





-Patient with work-related injury about 40 years ago.  Has had fusion of some 

cervical vertebra and in the back.  Patient had a fall a month ago.  Progressive

 increasing pain in the back and trouble walking walking.  Also bladder and 

urine has been affected bit worse than before: Slow to respond


Underwent lumbar surgery by  on April 11.  Hemovac in place.





-Acute postprocedure blood loss anemia, expected from surgery


 IV Ferrlecit-2 doses





-Leukocytosis from steroids





-Urine and bladder incontinence, some acute on chronic element


Clancy catheter removed





-Morbid obesity BMI 46.2


Weight loss measures





-Muscle spasm


Flexeril 10 mg when necessary





-Essential hypertension


Norvasc 2.5 mg, losartan hydrochlorothiazide 100/25,





-Hyperlipidemia


Crestor 5 mg





-Chronic gait dysfunction uses a walker at baseline.





-Bowel spasms.





  Discussed with patient.  Using incentive spirometry.  Going to rehab today





Thank you Dr. Townsend

## 2023-04-15 NOTE — P.DS
Providers


Date of admission: 


04/10/23 11:01





Expected date of discharge: 04/15/23


Attending physician: 


KULWINDER Townsend





Consults: 





                                        





04/10/23 11:01


Consult Physician Routine 


   Consulting Provider: Damion Bee


   Consult Reason/Comments: medical management


   Do you want consulting provider notified?: Already Contacted











Primary care physician: 


Jaden Richmond MD








- Discharge Diagnosis(es)


(1) Urinary retention


Current Visit: Yes   Status: Acute   





(2) Lower extremity weakness


Current Visit: Yes   Status: Acute   





(3) History of lumbar fusion


Current Visit: Yes   Status: Acute   





(4) Thoracolumbar back pain


Current Visit: Yes   Status: Acute   





(5) Essential hypertension


Current Visit: Yes   Status: Acute   





(6) Hyperlipidemia


Current Visit: Yes   Status: Acute   





(7) Obesity


Current Visit: Yes   Status: Acute   





(8) Difficulty in walking


Current Visit: Yes   Status: Acute   





(9) Thoracic stenosis


Current Visit: Yes   Status: Acute   





(10) Lumbar stenosis


Current Visit: Yes   Status: Acute   





(11) Cauda equina syndrome


Current Visit: Yes   Status: Acute   





(12) S/P lumbar fusion


Current Visit: Yes   Status: Acute   





(13) Fusion of spine, thoracolumbar region


Current Visit: Yes   Status: Acute   


Hospital Course: 





This is a pleasant 67-year-old male who presented with inability to ambulate, 

profound lower extremity weakness greater on the left than the right, urinary 

retention T10-11 severe central canal stenosis and foraminal stenosis with cord 

signal change, T11-12 moderate central canal stenosis and severe right foraminal

stenosis, T12-L1 mild central canal stenosis and bilateral neural foraminal 

narrowing, Thoracolumbar pain, and a history of multiple lumbar surgeries times 

approximately 15 with L1-2 and L5-S1 retained lumbar hardware with rods and 

screws and L2-3 and L5-S1 retained interbody device.





He was admitted for further treatment and evaluation.  He underwent an open T10-

T11, T11-12, and T12-L1 posterior lateral decompression and fusion with removal 

of hardware at L1 and L2.  Postoperatively he has had significant improvement of

his symptoms.  His Clancy catheter has been discontinued.  He is been able to 

void independently on his own without significant difficulty.  He has been able 

to have a small bowel movement.  He is not experiencing any abdominal pain.  He 

has profound lower extremity weakness has had improvement.  He is now able to 

perform active range of motion of his bilateral lower extremities.  He has been 

able to ambulate the restroom independently with the assistance of a walker 

while being monitored.  He is very happy with his progress.  His thoracolumbar 

pain has been controlled medication.  He continues to have some numbness over 

the bilateral thighs up towards his hips but states this has continued to 

improve postoperatively.





Currently, he is planned for discharge today to [facility].

 Patient has been cleared by medicine.  Medicine will completely med rec prior 

to discharge.  Patient is clear for discharge from an orthopedic spine 

standpoint.  Condition on day of discharge stable.  Patient was cleared 

preoperatively for surgery by Dr. Bee.  Patient currently denies any nausea,

vomiting, fever, or chills.  Patient is eating and voiding freely without 

difficulty.  





Hemovac drain and surgical dressings have been removed at the bedside today.  

Small dressing with Tegaderm has been placed over the Hemovac drain site.  

Patient may shower with this drain intact.  No dressing is currently required 

over his thoracolumbar surgical incision site.  He does have staples intact to 

the lower one third of his surgical incision site.  He should continue to remain

intact with the next 12 days.  They may be removed in 12 days at [facility].





Patient is encouraged to work with therapy and increase his mobility and 

ambulation.  He should wear his TLSO brace during ambulation, increased 

activities, and while working with physical therapy.  Brace does not have to be 

worn while lying in bed or bathing.  He is encouraged to continue utilizing a 

walker to aid in ambulation.  Patient should avoid excessive bending, lifting, 

and twisting; no lifting greater than 10 pounds.





MAPS has been reviewed today, 04/15/2023, with an Overall Overdose Risk Score of

190.  An "Opiod Start Talking" Form has been signed and placed in the patient's 

chart.  A prescription has been written for hydrocodone 10 mg/325 mg, 1 tablet 

every 4 hours as needed for acute pain, dispense #42.  Patient is also given 

prescription for cyclobenzaprine 10 mg, take 1 tab 3 times a day, as needed for 

muscle spasm, dispensed #60.  Prescriptions are written, signed, and placed in 

the patient's chart for discharge.





Patient's other medical diagnoses include essential hypertension, 

hyperlipidemia, and obesity.





Patient should avoid anti-inflammatory medication over the next 6 weeks 

postoperatively.





Patient given a prescription for Senokot-S, take 1 tab twice a day as needed for

constipation, dispensed #60.








Physical Exam on day of discharge:





Status post surgical day number 4


Surgical dressing remain intact over the thoracolumbar spine


No pain on palpation over the abdomen.


Hemovac drain has been discontinued; dressing and Tegaderm is placed over the 

drain removal site


Evidence of significant bruising at the upper buttock status post fall has 

continued to heal during his admission at Hospital


Significant improvement of profound left lower extremity weakness throughout 

range of motion


Patient is currently sitting in a bedside chair; patient is able to perform 

active hip flexion, knee extension, dorsiflexion, plantar flexion bilaterally


Patient is able to perform dorsiflexion, plantarflexion, and extensor hallucis 

longus on the right


No signs or symptoms of DVT; no calf pain


Clancy catheter has been discontinued


Reported decreased sensation in palpation over the anterior thighs as compared 

to palpation of the calves


Optifoam dressing has been removed or the thoracolumbar surgical site


No erythema, bruising, swelling, or obvious sign of infection of the 

thoracolumbar surgical site


Staples remain intact over the lower one third of the thoracolumbar surgical 

site





The patient is seen and examined at bedside.  I agree with the above.  Patient 

is making great progress since his surgery but still has a long way to go in 

terms of strength and mobility.  Hopefully he will continue to make good 

progress with rehabilitation and therapy.  It is okay for discharge today to 

rehab and will follow up on an outpatient basis.


Procedures: 





Status post open T10-T11, T11-12, and T12-L1 posterior lateral decompression and

fusion with removal of hardware at L1 and L2


Patient Condition at Discharge: Serious





Plan - Discharge Summary


Discharge Rx Participant: No


New Discharge Prescriptions: 


New


   Sennosides-Docusate Sodium [Senokot-S] 1 tab PO BID PRN #60 tablet


     PRN Reason: Constipation


   Cyclobenzaprine [Flexeril] 10 mg PO TID PRN #60 tab


     PRN Reason: Muscle Spasm


   HYDROcodone/APAP 10-325MG [Norco 10] 1 each PO Q4H PRN #42 tab


     PRN Reason: Pain


   predniSONE See Taper PO AS DIRECTED #24 tab





No Action


   Potassium Chloride [Klor-Con M20] 20 meq PO DAILY


   Naproxen Sodium [Aleve] 220 mg PO TID PRN


     PRN Reason: Pain


   Magnesium Citrate and Oxide [Magnesium] 250 mg PO DAILY


   amLODIPine [Norvasc] 2.5 mg PO DAILY


   Rosuvastatin Calcium 5 mg PO DAILY


   Losartan/Hydrochlorothiazide [Losartan-Hctz 100-25 mg Tab] 1 tab PO DAILY


   Cyclobenzaprine [Flexeril] 10 mg PO TID PRN


     PRN Reason: Muscle Spasm


   Sennosides/Docusate Sodium [Senna Plus 8.6-50 mg Softgel] 1 cap PO DAILY


   hydroCHLOROthiazide [Hydrodiuril] 25 mg PO DAILY


   Tamsulosin HCl [Flomax] 0.4 mg PO HS


Discharge Medication List





Cyclobenzaprine [Flexeril] 10 mg PO TID PRN 04/10/23 [History]


Losartan/Hydrochlorothiazide [Losartan-Hctz 100-25 mg Tab] 1 tab PO DAILY 

04/10/23 [History]


Magnesium Citrate and Oxide [Magnesium] 250 mg PO DAILY 04/10/23 [History]


Naproxen Sodium [Aleve] 220 mg PO TID PRN 04/10/23 [History]


Potassium Chloride [Klor-Con M20] 20 meq PO DAILY 04/10/23 [History]


Rosuvastatin Calcium 5 mg PO DAILY 04/10/23 [History]


Sennosides/Docusate Sodium [Senna Plus 8.6-50 mg Softgel] 1 cap PO DAILY 

04/10/23 [History]


Tamsulosin HCl [Flomax] 0.4 mg PO HS 04/10/23 [History]


amLODIPine [Norvasc] 2.5 mg PO DAILY 04/10/23 [History]


hydroCHLOROthiazide [Hydrodiuril] 25 mg PO DAILY 04/10/23 [History]


Cyclobenzaprine [Flexeril] 10 mg PO TID PRN #60 tab 04/15/23 [Rx]


HYDROcodone/APAP 10-325MG [Norco 10] 1 each PO Q4H PRN #42 tab 04/15/23 [Rx]


Sennosides-Docusate Sodium [Senokot-S] 1 tab PO BID PRN #60 tablet 04/15/23 [Rx]


predniSONE See Taper PO AS DIRECTED #24 tab 04/15/23 [Rx]








Follow up Appointment(s)/Referral(s): 


Jaden Richmond MD [Primary Care Provider] - 1-2 days


Lexx Soares PAC [PHYSICIAN ASSISTANT] - 2 Weeks


(Patient may follow-up with Lexx Soares PA-C or Dr. Eddie Townsend at [facility] in 2-3 weeks following discharge.


)


Peter Rodriguez [NON-STAFF] - As Needed (Please contact Raymundo if

you have questions about the TLSO brace. )


Activity/Diet/Wound Care/Special Instructions: 


1.  Small dressing with Tegaderm has been placed over the Hemovac drain site.  

Patient may shower with this drain intact.  Patient may continue with dressing 

changes as needed.  


2.  No dressing is currently required over his thoracolumbar surgical incision 

site.  Dressing may be placed over the thoracolumbar incision site as needed. 


3.  Patient does have staples intact to the lower one third of his surgical 

incision site.  Staples should remain intact over the next 12 days.  Staples may

be removed in 12 days at [facility].


4.  Patient is encouraged to work with therapy and increase his mobility and 

ambulation.  He should wear his TLSO brace during ambulation, increased 

activities, and while working with physical therapy.  Brace does not have to be 

worn while lying in bed or bathing.  


5.  Patient is encouraged to continue utilizing a walker to aid in ambulation.  


6.  Patient should refrain from driving until at least after their first follow-

up appointment in the office.


7.  Patient should avoid excessive bending, twisting, lifting; avoid overhead 

lifting; no lifting greater than 10 pounds


8.  Take medications as prescribed


9.  Patient should avoid anti-inflammatory medications over the next 6 weeks 

postoperatively


10.  Do not soak in tub


Discharge Disposition: TRANSFER TO SNF/ECF

## 2023-10-06 ENCOUNTER — HOSPITAL ENCOUNTER (EMERGENCY)
Dept: HOSPITAL 47 - EC | Age: 68
Discharge: HOME | End: 2023-10-06
Payer: MEDICARE

## 2023-10-06 VITALS
RESPIRATION RATE: 24 BRPM | HEART RATE: 93 BPM | TEMPERATURE: 98.2 F | DIASTOLIC BLOOD PRESSURE: 91 MMHG | SYSTOLIC BLOOD PRESSURE: 190 MMHG

## 2023-10-06 DIAGNOSIS — Z88.5: ICD-10-CM

## 2023-10-06 DIAGNOSIS — N39.9: Primary | ICD-10-CM

## 2023-10-06 DIAGNOSIS — Z79.899: ICD-10-CM

## 2023-10-06 DIAGNOSIS — I10: ICD-10-CM

## 2023-10-06 DIAGNOSIS — Z88.8: ICD-10-CM

## 2023-10-06 DIAGNOSIS — Z90.49: ICD-10-CM

## 2023-10-06 PROCEDURE — 99283 EMERGENCY DEPT VISIT LOW MDM: CPT

## 2023-10-06 PROCEDURE — 51702 INSERT TEMP BLADDER CATH: CPT

## 2023-10-06 NOTE — ED
Male Urogenital HPI





- General


Chief complaint: Urogenital


Stated complaint: Urinating Issue


Time Seen by Provider: 10/06/23 15:01


Source: patient, RN notes reviewed


Mode of arrival: ambulatory


Limitations: no limitations





- History of Present Illness


Initial comments: 


This is a 68-year-old male who presents to the emergency department for urinary 

problems.  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 

Dr. Townsend in April of this year, which corrected the issue.  States that he has 

had problems with urination ever since.  He is unable to feel when he needs to 

go and is unable to control the flow.  This has been having a substantial impact

on his life.  He was using an external catheter, but states that his penis was 

becoming very raw.  He was subsequently advised to come here for urinary 

catheter placement.  He tried to make a follow-up appointment with urology, but 

states that they could not schedule him until January.





Denies any fevers, chills, sore throat, cough, dyspnea, chest pain, 

palpitations, abdominal pain, nausea, vomiting, diarrhea, back pain, or 

headaches.








- Related Data


                                Home Medications











 Medication  Instructions  Recorded  Confirmed


 


Cyclobenzaprine [Flexeril] 10 mg PO TID PRN 04/10/23 04/10/23


 


Losartan/Hydrochlorothiazide 1 tab PO DAILY 04/10/23 04/10/23





[Losartan-Hctz 100-25 mg Tab]   


 


Magnesium Citrate and Oxide 250 mg PO DAILY 04/10/23 04/10/23





[Magnesium]   


 


Naproxen Sodium [Aleve] 220 mg PO TID PRN 04/10/23 04/10/23


 


Potassium Chloride [Klor-Con M20] 20 meq PO DAILY 04/10/23 04/10/23


 


Rosuvastatin Calcium 5 mg PO DAILY 04/10/23 04/10/23


 


Sennosides/Docusate Sodium [Senna 1 cap PO DAILY 04/10/23 04/10/23





Plus 8.6-50 mg Softgel]   


 


Tamsulosin HCl [Flomax] 0.4 mg PO HS 04/10/23 04/10/23


 


amLODIPine [Norvasc] 2.5 mg PO DAILY 04/10/23 04/10/23








                                  Previous Rx's











 Medication  Instructions  Recorded


 


Cyclobenzaprine [Flexeril] 10 mg PO TID PRN #60 tab 04/15/23


 


Enoxaparin [Lovenox] 40 mg SQ DAILY  each 04/15/23


 


HYDROcodone/APAP 10-325MG [Norco 1 each PO Q4H PRN #42 tab 04/15/23





10]  


 


Magnesium Hydroxide [Milk of 2,400 mg PO DAILY PRN  ml 04/15/23





Magnesia Concentrate]  


 


predniSONE See Taper PO AS DIRECTED #24 tab 04/15/23











                                    Allergies











Allergy/AdvReac Type Severity Reaction Status Date / Time


 


morphine Allergy  Unknown Verified 10/06/23 14:59


 


oxycodone HCl Allergy  Unknown Verified 10/06/23 14:59





[From OxyContin]     














Review of Systems


ROS Statement: 


Those systems with pertinent positive or pertinent negative responses have been 

documented in the HPI.





ROS Other: All systems not noted in ROS Statement are negative.





Past Medical History


Past Medical History: Hypertension


Additional Past Medical History / Comment(s): BACK PAIN


History of Any Multi-Drug Resistant Organisms: None Reported


Past Surgical History: Appendectomy, Back Surgery


Additional Past Surgical History / Comment(s): 13 BACK SURGERIES, BILATERAL KNEE

SURGERY, NOSE SURGERY


Past Psychological History: No Psychological Hx Reported


Smoking Status: Never smoker


Past Alcohol Use History: Rare


Past Drug Use History: None Reported





General Exam


Limitations: no limitations


General appearance: alert, in no apparent distress


Head exam: Present: atraumatic, normocephalic, normal inspection


Respiratory exam: Present: normal lung sounds bilaterally.  Absent: respiratory 

distress, wheezes, rales, rhonchi, stridor


Cardiovascular Exam: Present: regular rate, normal rhythm, normal heart sounds. 

Absent: systolic murmur, diastolic murmur, rubs, gallop, clicks


 exam: Present: normal inspection.  Absent: testicular tenderness, urethral 

discharge


External exam: Present: normal external exam


Neurological exam: Present: alert, oriented X3, CN II-XII intact


Psychiatric exam: Present: normal affect, normal mood


Skin exam: Present: warm, dry, intact, normal color.  Absent: rash





Course


                                   Vital Signs











  10/06/23





  14:57


 


Temperature 98.2 F


 


Pulse Rate 93


 


Respiratory 24





Rate 


 


Blood Pressure 190/91


 


O2 Sat by Pulse 97





Oximetry 














Medical Decision Making





- Medical Decision Making


This is a 68-year-old male who presents to the emergency department for urinary 

problems.





Was pt. sent in by a medical professional or institution?


@  -No   


Did you speak to anyone other than the patient for history?  


@  -No


Did you review nursing and triage notes? 


@  -Yes, and I agree, it is accurate with regards to the patient's symptoms.


Were old charts reviewed? 


@  -No


Differential Diagnosis? 


@  -Differential Urinary Difficulties:


UTI, renal calculus, nerve damage, back problems, this is not meant to be an 

all-inclusive list. 


EKG interpreted by me (3pts min.)?


@  -Not obtained


X-rays interpreted by me (1pt min.)?


@  -Not obtained


CT interpreted by me (1pt min.)?


@  -Not obtained


U/S interpreted by me (1pt. min.)?


@  -Not obtained


What testing was considered but not performed? (CT, X-rays, U/S, labs)? Why?


@  -None


What meds were considered but not given? Why?


@  -None


Did you discuss the management of the patient with other professionals?


@  -No


Did you reconcile home meds?


@  -No


Was smoking cessation discussed for >3mins.?


@  -No


Was critical care preformed (if so, how long)?


@  -No


Were there social determinants of health that impacted care today? How? 

(Homelessness, low income, unemployed, alcoholism, drug addiction, 

transportation, low edu. Level, literacy, decrease access to med. care, nursing home, 

rehab)?


@  -No


Was there de-escalation of care discussed even if they declined? (Discuss DNR or

withdrawal of care, Hospice)?


@  -No


What co-morbidities impacted this encounter? (DM, HTN, Smoking, COPD, CAD, 

Cancer, CVA, Hep., AIDS, mental health diagnosis, sleep apnea, morbid obesity)?


@  -Morbid obesity


Was patient admitted / discharged?


@  -Discharged. Urinary catheter was inserted without difficulty.  Patient 

states that he tolerated this well and in general felt much better.  Nursing 

staff educated the patient on urinary catheter care.  Our  tried to 

contact the urology office to get him a sooner appointment, however they were 

already closed.  Advised the patient to call them on Monday, let them know that 

he was seen in the emergency department and had a catheter placed, to see if 

they can schedule him for a sooner appointment.  Patient discharged home in stab

le condition.


Undiagnosed new problem with uncertain prognosis?


@  -None


Drug Therapy requiring intensive monitoring for toxicity (Heparin, Nitro, 

Insulin, Cardizem)?


@  -None


Were any procedures done?


@  -None


Diagnosis/symptom?


@  -Urinary problems


Acute, or Chronic, or Acute on Chronic?


@  -Chronic


Uncomplicated (without systemic symptoms) or Complicated (systemic symptoms)?


@  -Uncomplicated


Side effects of treatment?


@  -None


Exacerbation, Progression, or Severe Exacerbation]


@  -Progression


Poses a threat to life or bodily function?


@  -No





Return precautions reviewed in depth, the patient is instructed to return to the

emergency department with any new, worsening, or concerning symptoms. Patient 

verbalized understanding. 





This case was discussed in detail with the attending ED physician, Dr. Moore.

Presentation, findings, and treatment plan discussed in detail as well. 








Disposition


Clinical Impression: 


 Urine troubles





Disposition: HOME SELF-CARE


Instructions (If sedation given, give patient instructions):  Clancy Catheter 

Placement and Care (ED), Clancy Catheter Removal (DC), How to Change a Catheter 

Drainage Bag (DC)


Additional Instructions: 


Return to the emergency department with any new, worsening, or concerning 

symptoms.  Contact urology Monday morning.  Let them know that you were seen in 

the emergency department had a Clancy catheter placed, and see if they can get 

you in for a sooner follow-up appointment.  Follow up with your primary care 

provider in 1-2 days.


Is patient prescribed a controlled substance at d/c from ED?: No


Referrals: 


Amy Box MD [Primary Care Provider] - 1-2 days


Regan Olvera MD [STAFF PHYSICIAN] - 1-2 days